# Patient Record
Sex: FEMALE | Race: WHITE | NOT HISPANIC OR LATINO | Employment: PART TIME | URBAN - METROPOLITAN AREA
[De-identification: names, ages, dates, MRNs, and addresses within clinical notes are randomized per-mention and may not be internally consistent; named-entity substitution may affect disease eponyms.]

---

## 2024-05-06 ENCOUNTER — LAB (OUTPATIENT)
Dept: LAB | Facility: HOSPITAL | Age: 64
End: 2024-05-06
Payer: COMMERCIAL

## 2024-05-06 ENCOUNTER — OFFICE VISIT (OUTPATIENT)
Dept: FAMILY MEDICINE CLINIC | Facility: CLINIC | Age: 64
End: 2024-05-06
Payer: COMMERCIAL

## 2024-05-06 VITALS
DIASTOLIC BLOOD PRESSURE: 82 MMHG | TEMPERATURE: 98.6 F | WEIGHT: 264.4 LBS | HEART RATE: 90 BPM | SYSTOLIC BLOOD PRESSURE: 124 MMHG | OXYGEN SATURATION: 96 % | BODY MASS INDEX: 44.05 KG/M2 | HEIGHT: 65 IN

## 2024-05-06 DIAGNOSIS — L40.50 PSORIATIC ARTHRITIS: ICD-10-CM

## 2024-05-06 DIAGNOSIS — G47.00 INSOMNIA, UNSPECIFIED TYPE: ICD-10-CM

## 2024-05-06 DIAGNOSIS — M47.816 ARTHRITIS, LUMBAR SPINE: Primary | ICD-10-CM

## 2024-05-06 DIAGNOSIS — K21.9 GASTROESOPHAGEAL REFLUX DISEASE, UNSPECIFIED WHETHER ESOPHAGITIS PRESENT: ICD-10-CM

## 2024-05-06 DIAGNOSIS — Z00.00 WELLNESS EXAMINATION: ICD-10-CM

## 2024-05-06 DIAGNOSIS — E78.5 HYPERLIPIDEMIA, UNSPECIFIED HYPERLIPIDEMIA TYPE: ICD-10-CM

## 2024-05-06 DIAGNOSIS — M47.816 ARTHRITIS, LUMBAR SPINE: ICD-10-CM

## 2024-05-06 LAB
DEPRECATED RDW RBC AUTO: 52.3 FL (ref 37–54)
ERYTHROCYTE [DISTWIDTH] IN BLOOD BY AUTOMATED COUNT: 14.9 % (ref 12.3–15.4)
HCT VFR BLD AUTO: 45.1 % (ref 34–46.6)
HGB BLD-MCNC: 15.3 G/DL (ref 12–15.9)
MCH RBC QN AUTO: 32.7 PG (ref 26.6–33)
MCHC RBC AUTO-ENTMCNC: 33.9 G/DL (ref 31.5–35.7)
MCV RBC AUTO: 96.4 FL (ref 79–97)
PLATELET # BLD AUTO: 280 10*3/MM3 (ref 140–450)
PMV BLD AUTO: 9.4 FL (ref 6–12)
RBC # BLD AUTO: 4.68 10*6/MM3 (ref 3.77–5.28)
WBC NRBC COR # BLD AUTO: 6.68 10*3/MM3 (ref 3.4–10.8)

## 2024-05-06 PROCEDURE — 85027 COMPLETE CBC AUTOMATED: CPT

## 2024-05-06 PROCEDURE — 82607 VITAMIN B-12: CPT

## 2024-05-06 PROCEDURE — 84443 ASSAY THYROID STIM HORMONE: CPT

## 2024-05-06 PROCEDURE — 80053 COMPREHEN METABOLIC PANEL: CPT

## 2024-05-06 PROCEDURE — 99204 OFFICE O/P NEW MOD 45 MIN: CPT | Performed by: STUDENT IN AN ORGANIZED HEALTH CARE EDUCATION/TRAINING PROGRAM

## 2024-05-06 PROCEDURE — 80061 LIPID PANEL: CPT

## 2024-05-06 PROCEDURE — 86140 C-REACTIVE PROTEIN: CPT

## 2024-05-06 PROCEDURE — 85652 RBC SED RATE AUTOMATED: CPT

## 2024-05-06 PROCEDURE — 82306 VITAMIN D 25 HYDROXY: CPT

## 2024-05-06 PROCEDURE — 83036 HEMOGLOBIN GLYCOSYLATED A1C: CPT

## 2024-05-06 PROCEDURE — 36415 COLL VENOUS BLD VENIPUNCTURE: CPT

## 2024-05-06 RX ORDER — QUETIAPINE FUMARATE 100 MG/1
100 TABLET, FILM COATED ORAL NIGHTLY
COMMUNITY
Start: 2024-05-03

## 2024-05-06 RX ORDER — ONDANSETRON 4 MG/1
4 TABLET, ORALLY DISINTEGRATING ORAL EVERY 8 HOURS PRN
Qty: 30 TABLET | Refills: 0 | Status: SHIPPED | OUTPATIENT
Start: 2024-05-06

## 2024-05-06 RX ORDER — ROSUVASTATIN CALCIUM 10 MG/1
10 TABLET, COATED ORAL DAILY
COMMUNITY

## 2024-05-06 RX ORDER — OMEPRAZOLE 40 MG/1
40 CAPSULE, DELAYED RELEASE ORAL DAILY
COMMUNITY
End: 2024-05-06 | Stop reason: SDUPTHER

## 2024-05-06 RX ORDER — QUETIAPINE FUMARATE 400 MG/1
400 TABLET, FILM COATED ORAL NIGHTLY
COMMUNITY
Start: 2024-05-03

## 2024-05-06 RX ORDER — ZOLPIDEM TARTRATE 10 MG/1
10 TABLET ORAL NIGHTLY PRN
COMMUNITY

## 2024-05-06 RX ORDER — OMEPRAZOLE 40 MG/1
40 CAPSULE, DELAYED RELEASE ORAL DAILY
Qty: 90 CAPSULE | Refills: 1 | Status: SHIPPED | OUTPATIENT
Start: 2024-05-06

## 2024-05-06 RX ORDER — NYSTATIN AND TRIAMCINOLONE ACETONIDE 100000; 1 [USP'U]/G; MG/G
1 OINTMENT TOPICAL 2 TIMES DAILY
COMMUNITY

## 2024-05-07 DIAGNOSIS — E53.8 LOW SERUM VITAMIN B12: Primary | ICD-10-CM

## 2024-05-07 LAB
25(OH)D3 SERPL-MCNC: 29 NG/ML (ref 30–100)
ALBUMIN SERPL-MCNC: 4.4 G/DL (ref 3.5–5.2)
ALBUMIN/GLOB SERPL: 1.3 G/DL
ALP SERPL-CCNC: 103 U/L (ref 39–117)
ALT SERPL W P-5'-P-CCNC: 16 U/L (ref 1–33)
ANION GAP SERPL CALCULATED.3IONS-SCNC: 14.6 MMOL/L (ref 5–15)
AST SERPL-CCNC: 22 U/L (ref 1–32)
BILIRUB SERPL-MCNC: 0.4 MG/DL (ref 0–1.2)
BUN SERPL-MCNC: 13 MG/DL (ref 8–23)
BUN/CREAT SERPL: 15.5 (ref 7–25)
CALCIUM SPEC-SCNC: 9.6 MG/DL (ref 8.6–10.5)
CHLORIDE SERPL-SCNC: 101 MMOL/L (ref 98–107)
CHOLEST SERPL-MCNC: 187 MG/DL (ref 0–200)
CO2 SERPL-SCNC: 23.4 MMOL/L (ref 22–29)
CREAT SERPL-MCNC: 0.84 MG/DL (ref 0.57–1)
CRP SERPL-MCNC: 0.33 MG/DL (ref 0–0.5)
EGFRCR SERPLBLD CKD-EPI 2021: 77.7 ML/MIN/1.73
ERYTHROCYTE [SEDIMENTATION RATE] IN BLOOD: 15 MM/HR (ref 0–30)
GLOBULIN UR ELPH-MCNC: 3.3 GM/DL
GLUCOSE SERPL-MCNC: 96 MG/DL (ref 65–99)
HBA1C MFR BLD: 6.3 % (ref 4.8–5.6)
HDLC SERPL-MCNC: 94 MG/DL (ref 40–60)
LDLC SERPL CALC-MCNC: 73 MG/DL (ref 0–100)
LDLC/HDLC SERPL: 0.74 {RATIO}
POTASSIUM SERPL-SCNC: 4.9 MMOL/L (ref 3.5–5.2)
PROT SERPL-MCNC: 7.7 G/DL (ref 6–8.5)
SODIUM SERPL-SCNC: 139 MMOL/L (ref 136–145)
TRIGL SERPL-MCNC: 117 MG/DL (ref 0–150)
TSH SERPL DL<=0.05 MIU/L-ACNC: 1.36 UIU/ML (ref 0.27–4.2)
VIT B12 BLD-MCNC: 196 PG/ML (ref 211–946)
VLDLC SERPL-MCNC: 20 MG/DL (ref 5–40)

## 2024-05-07 RX ORDER — CYANOCOBALAMIN, ISOPROPYL ALCOHOL 1000MCG/ML
1000 KIT INJECTION
Qty: 1 KIT | Refills: 11 | Status: SHIPPED | OUTPATIENT
Start: 2024-05-07

## 2024-05-09 ENCOUNTER — TELEPHONE (OUTPATIENT)
Dept: FAMILY MEDICINE CLINIC | Facility: CLINIC | Age: 64
End: 2024-05-09
Payer: COMMERCIAL

## 2024-05-10 DIAGNOSIS — I25.10 CORONARY ARTERY DISEASE DUE TO LIPID RICH PLAQUE: Primary | ICD-10-CM

## 2024-05-10 DIAGNOSIS — E66.01 MORBID (SEVERE) OBESITY DUE TO EXCESS CALORIES: ICD-10-CM

## 2024-05-10 DIAGNOSIS — I25.83 CORONARY ARTERY DISEASE DUE TO LIPID RICH PLAQUE: Primary | ICD-10-CM

## 2024-05-12 LAB — DRUGS UR: NORMAL

## 2024-05-13 ENCOUNTER — TELEPHONE (OUTPATIENT)
Dept: FAMILY MEDICINE CLINIC | Facility: CLINIC | Age: 64
End: 2024-05-13
Payer: COMMERCIAL

## 2024-05-13 NOTE — TELEPHONE ENCOUNTER
Caller: Reshma Akhtar    Relationship: Self    Best call back number: [phone]     PLEASE CALL AND ADVISE ON THE REASON FOR THE PAIN MANAGEMENT REFERRAL

## 2024-05-13 NOTE — TELEPHONE ENCOUNTER
Caller: Reshma Akhtar    Relationship: Self    Best call back number: [phone]     SHE WANTS TO KNOW IF SUSY BRUNSON WILL BE REFILLING HER AMBIEN PRESCRIPTION WHEN SHE IS READY FOR A REFILL.    PLEASE CALL AND ADVISE.

## 2024-05-13 NOTE — TELEPHONE ENCOUNTER
HUB TO RELAY    LVM. Please let patient know pain management referral is for the injections she requested she wanted in her back.

## 2024-05-13 NOTE — TELEPHONE ENCOUNTER
Caller: Reshma Akhtar    Relationship: Self    Best call back number: [phone]     Requested Prescriptions:   zolpidem (AMBIEN) 10 MG tablet      Last office visit with prescribing clinician: 5/6/2024   Last telemedicine visit with prescribing clinician: Visit date not found   Next office visit with prescribing clinician: 5/30/2024     Additional details provided by patient: PATIENT HAS MEDICATION AND DOES NOT NEED A REFILL AT THIS TIME. SHE USED TO LIVE IN [place] AND HER PSYCHIATRIST PRESCRIBED IT AND WHEN LIVING IN [place] HER PCP PRESCRIBED IT. ASKING IF DR BRUNSON WILL TAKE OVER THIS PRESCRIPTION OR ADVISE WHO SHE SHOULD SEE TO CONTINUE THE PRESCRIPTION.    Does the patient have less than a 3 day supply:  [] Yes  [x] No HAS PLENTY    Would you like a call back once the refill request has been completed: [x] Yes TO ADVISE    If the office needs to give you a call back, can they leave a voicemail: [x] Yes  ALSO OK TO RESPOND THRU Arabella Avila Rep   05/13/24 13:33 EDT

## 2024-05-13 NOTE — TELEPHONE ENCOUNTER
Caller: Reshma Akhtar    Relationship to patient: Self    Best call back number: [phone]     Chief complaint: B-12 SHOT    Type of visit: B-12 SHOT ONLY    Additional notes: PATIENT HAS PICKED UP THE B-12 SHOTS FROM THE PHARMACY. THEY DID NOT COME WITH NEEDLES. WAS TOLD BY PHARMACY TO CALL PCP TO SCHEDULE.

## 2024-05-13 NOTE — TELEPHONE ENCOUNTER
Pharmacy Name: [facility] [address] [place] [address] - [address] [facility] - [phone] [place] [phone] FX     Reference Number (if applicable):     Pharmacy representative name: RON    Pharmacy representative phone number: [phone]     What medication are you calling in regards to: NEEDLES THAT WHERE CALLED IN ARE NOT IN STOCK. PHARMACY WOULD LIKE TO SEE IF THE 18 JUAN MIGUEL BY 1 INCH WOULD BE OK?    What question does the pharmacy have: CARMELLA    Who is the provider that prescribed the medication: SUSY BRUNSON    Additional notes: CARMELLA

## 2024-05-13 NOTE — TELEPHONE ENCOUNTER
HUB TO RELAY    LVM. Please let patient know Ambien was sent over to pharmacy and she can walk in at anytime for B12 shot.

## 2024-05-13 NOTE — TELEPHONE ENCOUNTER
Called and spoke with patient and she is going to follow up with Rheumatology about the injections. She will see pain management if rheumatology wont do them.

## 2024-05-13 NOTE — TELEPHONE ENCOUNTER
Name: Reshma Akhtar    Relationship: Self    Best Callback Number: [phone]     HUB PROVIDED THE RELAY MESSAGE FROM THE OFFICE   PATIENT HAS FURTHER QUESTIONS AND WOULD LIKE A CALL BACK AT THE FOLLOWING PHONE NUMBER [phone]    ADDITIONAL INFORMATION: PATIENT STATES SHE USUALLY GOES TO HER RHEUMATOLOGIST FOR THE LOW BACK INJECTIONS. ASKING IF SHE SHOULD SCHEDULE WITH THEM OR TO PAIN MANAGEMENT.

## 2024-05-28 RX ORDER — ROSUVASTATIN CALCIUM 10 MG/1
10 TABLET, COATED ORAL DAILY
Qty: 90 TABLET | Refills: 0 | Status: SHIPPED | OUTPATIENT
Start: 2024-05-28

## 2024-05-29 DIAGNOSIS — I70.90 ATHEROSCLEROSIS: Primary | ICD-10-CM

## 2024-05-30 ENCOUNTER — OFFICE VISIT (OUTPATIENT)
Dept: FAMILY MEDICINE CLINIC | Facility: CLINIC | Age: 64
End: 2024-05-30
Payer: COMMERCIAL

## 2024-05-30 VITALS
RESPIRATION RATE: 20 BRPM | BODY MASS INDEX: 44.82 KG/M2 | DIASTOLIC BLOOD PRESSURE: 88 MMHG | HEART RATE: 82 BPM | HEIGHT: 65 IN | SYSTOLIC BLOOD PRESSURE: 142 MMHG | OXYGEN SATURATION: 95 % | TEMPERATURE: 98.6 F | WEIGHT: 269 LBS

## 2024-05-30 DIAGNOSIS — Z13.820 ENCOUNTER FOR OSTEOPOROSIS SCREENING IN ASYMPTOMATIC POSTMENOPAUSAL PATIENT: ICD-10-CM

## 2024-05-30 DIAGNOSIS — Z00.00 WELLNESS EXAMINATION: ICD-10-CM

## 2024-05-30 DIAGNOSIS — Z12.31 ENCOUNTER FOR SCREENING MAMMOGRAM FOR MALIGNANT NEOPLASM OF BREAST: ICD-10-CM

## 2024-05-30 DIAGNOSIS — F51.01 PRIMARY INSOMNIA: Primary | ICD-10-CM

## 2024-05-30 DIAGNOSIS — K21.9 GASTROESOPHAGEAL REFLUX DISEASE, UNSPECIFIED WHETHER ESOPHAGITIS PRESENT: ICD-10-CM

## 2024-05-30 DIAGNOSIS — Z78.0 ENCOUNTER FOR OSTEOPOROSIS SCREENING IN ASYMPTOMATIC POSTMENOPAUSAL PATIENT: ICD-10-CM

## 2024-05-30 DIAGNOSIS — Z12.11 SCREENING FOR COLON CANCER: ICD-10-CM

## 2024-05-30 PROCEDURE — 99214 OFFICE O/P EST MOD 30 MIN: CPT | Performed by: STUDENT IN AN ORGANIZED HEALTH CARE EDUCATION/TRAINING PROGRAM

## 2024-05-30 PROCEDURE — 99396 PREV VISIT EST AGE 40-64: CPT | Performed by: STUDENT IN AN ORGANIZED HEALTH CARE EDUCATION/TRAINING PROGRAM

## 2024-05-30 RX ORDER — OMEPRAZOLE 40 MG/1
40 CAPSULE, DELAYED RELEASE ORAL DAILY
Qty: 90 CAPSULE | Refills: 1 | Status: SHIPPED | OUTPATIENT
Start: 2024-05-30

## 2024-05-30 RX ORDER — ZOLPIDEM TARTRATE 10 MG/1
10 TABLET ORAL NIGHTLY PRN
Qty: 30 TABLET | Refills: 0 | Status: SHIPPED | OUTPATIENT
Start: 2024-05-30

## 2024-05-30 NOTE — PROGRESS NOTES
"Chief Complaint  Annual Exam     History of Present Illness      Annual exam  Colonoscopy ordered.   Mammogram ordered   Pap smear: she will call to schedule. She reports last was normal.   Dexa ordered.   Counseled on diet and exercise including limited sweets and sugars to focus on lean meat and vegetables. Counseled on 50 minutes of moderate exercise 3 times per week.   Recommend dental and eye exam  hiv hep c screening: she declines  Vaccines recommended:  covid vaccine  Rsv vaccine  Shingles  Pneumonia  Tdap  hepatitis      The following portions of the patient's history were reviewed and updated as appropriate: allergies, current medications, past family history, past medical history, past social history, past surgical history, and problem list.    OBJECTIVE:  /88   Pulse 82   Temp 98.6 °F (37 °C) (Temporal)   Resp 20   Ht 165.1 cm (65\")   Wt 122 kg (269 lb)   SpO2 95%   BMI 44.76 kg/m²       Physical Exam  Constitutional:       General: She is not in acute distress.     Appearance: Normal appearance.   HENT:      Head: Normocephalic and atraumatic.   Eyes:      Extraocular Movements: Extraocular movements intact.   Cardiovascular:      Rate and Rhythm: Normal rate and regular rhythm.      Heart sounds: No murmur heard.  Pulmonary:      Effort: Pulmonary effort is normal. No respiratory distress.      Breath sounds: Normal breath sounds. No stridor. No wheezing, rhonchi or rales.   Skin:     Findings: No rash.   Neurological:      General: No focal deficit present.      Mental Status: She is alert.   Psychiatric:         Mood and Affect: Mood normal.                    Assessment and Plan   Diagnoses and all orders for this visit:    1. Primary insomnia (Primary)  -     zolpidem (AMBIEN) 10 MG tablet; Take 1 tablet by mouth At Night As Needed for Sleep.  Dispense: 30 tablet; Refill: 0    2. Gastroesophageal reflux disease, unspecified whether esophagitis present  -     omeprazole (priLOSEC) 40 MG " "capsule; Take 1 capsule by mouth Daily.  Dispense: 90 capsule; Refill: 1    3. Encounter for screening mammogram for malignant neoplasm of breast  -     Mammo screening digital tomosynthesis bilateral w CAD; Future    4. Screening for colon cancer  -     Ambulatory Referral For Screening Colonoscopy    5. Wellness examination    6. Encounter for osteoporosis screening in asymptomatic postmenopausal patient  -     DEXA Bone Density Axial; Future    Other orders  -     Semaglutide-Weight Management 0.25 MG/0.5ML solution auto-injector; Inject 0.5 mL under the skin into the appropriate area as directed 1 (One) Time Per Week.  Dispense: 2 mL; Refill: 0  -     Needle, Disp, 20G X 1\" misc; Use 1 each Every 28 (Twenty-Eight) Days.  Dispense: 12 each; Refill: 0      Annual exam  Colonoscopy ordered.   Mammogram ordered   Pap smear: she will call to schedule. She reports last was normal.   Dexa ordered.   Counseled on diet and exercise including limited sweets and sugars to focus on lean meat and vegetables. Counseled on 50 minutes of moderate exercise 3 times per week.   Recommend dental and eye exam  hiv hep c screening: she declines  Vaccines recommended:  covid vaccine  Rsv vaccine  Shingles  Pneumonia  Tdap  hepatitis    Insomnia  Counseled on addiction, falls, sedation on this medication and to keep it locked away from others. No unwanted se at this time.       Weight management  She reports ozempic wasn't covered at the pharmacy we sent it to will send to correct pharmacy. Discussed side effects are same as we discussed before.   If still not covered start contrave. Counseled on possible side effects such as nausea dizziness headache. Counseled on rare side effects such as serotonin syndrome which would be fast heart rate elevated bp irritability . Discussed s/e such as blurry vision dry mouth constipation urinary issues. Counseled that mood changes or suicidal thoughts may happen. She will monitor for these and seek " care if this happens.   Advised her to not take with alcohol.       Return in about 1 month (around 6/30/2024).       Natasha Mccullough D.O.  [facility] [place]

## 2024-06-03 ENCOUNTER — PREP FOR SURGERY (OUTPATIENT)
Dept: OTHER | Facility: HOSPITAL | Age: 64
End: 2024-06-03

## 2024-06-03 DIAGNOSIS — Z12.11 ENCOUNTER FOR SCREENING COLONOSCOPY: Primary | ICD-10-CM

## 2024-06-04 DIAGNOSIS — K21.9 GASTROESOPHAGEAL REFLUX DISEASE, UNSPECIFIED WHETHER ESOPHAGITIS PRESENT: ICD-10-CM

## 2024-06-04 RX ORDER — OMEPRAZOLE 40 MG/1
40 CAPSULE, DELAYED RELEASE ORAL DAILY
Qty: 90 CAPSULE | Refills: 1 | Status: SHIPPED | OUTPATIENT
Start: 2024-06-04

## 2024-06-04 RX ORDER — NYSTATIN AND TRIAMCINOLONE ACETONIDE 100000; 1 [USP'U]/G; MG/G
1 OINTMENT TOPICAL 2 TIMES DAILY
OUTPATIENT
Start: 2024-06-04

## 2024-06-05 ENCOUNTER — TELEPHONE (OUTPATIENT)
Dept: FAMILY MEDICINE CLINIC | Facility: CLINIC | Age: 64
End: 2024-06-05

## 2024-06-05 ENCOUNTER — TELEPHONE (OUTPATIENT)
Dept: FAMILY MEDICINE CLINIC | Facility: CLINIC | Age: 64
End: 2024-06-05
Payer: COMMERCIAL

## 2024-06-05 DIAGNOSIS — M54.50 CHRONIC BILATERAL LOW BACK PAIN WITHOUT SCIATICA: Primary | ICD-10-CM

## 2024-06-05 DIAGNOSIS — G89.29 CHRONIC BILATERAL LOW BACK PAIN WITHOUT SCIATICA: Primary | ICD-10-CM

## 2024-06-05 NOTE — TELEPHONE ENCOUNTER
Caller: Reshma Akhtar    Relationship: Self    Best call back number: [phone]     What medication are you requesting: DICLOFENAC-SODIUM TOPICAL GEL 1%    What are your current symptoms: LOWER BACK PAIN    How long have you been experiencing symptoms: OVER A YEAR    Have you had these symptoms before:    [x] Yes  [] No    Have you been treated for these symptoms before:   [x] Yes  [] No    If a prescription is needed, what is your preferred pharmacy and phone number: [facility] [address] [place] [address] [phone] [facility] [phone]      Additional notes:

## 2024-06-06 ENCOUNTER — TELEPHONE (OUTPATIENT)
Dept: FAMILY MEDICINE CLINIC | Facility: CLINIC | Age: 64
End: 2024-06-06
Payer: COMMERCIAL

## 2024-06-06 NOTE — TELEPHONE ENCOUNTER
Caller: Reshma Akhtar    Relationship: Self    Best call back number: [phone]    Which medication are you concerned about: WEGOVY    Who prescribed you this medication: DR SUSY BRUNSON    What are your concerns: PATIENT STATES INSURANCE HAS DENIED THE PRIOR AUTHORIZATION FOR THE WEGOVY SO WOULD LIKE A PRESCRIPTION SENT TO [facility] FOR CONTRAVE. PLEASE ADVISE AND CALL PATIENT BACK

## 2024-06-06 NOTE — TELEPHONE ENCOUNTER
Can she do video visit to discuss contrave side effects , there are some I'm worried about with her current meds

## 2024-06-13 ENCOUNTER — PRIOR AUTHORIZATION (OUTPATIENT)
Dept: FAMILY MEDICINE CLINIC | Facility: CLINIC | Age: 64
End: 2024-06-13
Payer: COMMERCIAL

## 2024-06-13 NOTE — TELEPHONE ENCOUNTER
6/13/24    PA for Wegovy denied      Reshma Akhtar   (Key: [id number])  Rx #: [id number]  Wegovy 0.25MG/0.5ML auto-injectors  Determination  Unfavorable  7 days ago  Your prior authorization for Wegovy has been denied.  When applicable, information about how to complete an appeal for this patient will be sent to you. Please also see the determination letter provided by the payer/PBM for more information.    Message from plan: Your request has been denied

## 2024-06-24 ENCOUNTER — TELEMEDICINE (OUTPATIENT)
Dept: FAMILY MEDICINE CLINIC | Facility: CLINIC | Age: 64
End: 2024-06-24
Payer: COMMERCIAL

## 2024-06-24 DIAGNOSIS — E66.01 MORBID (SEVERE) OBESITY DUE TO EXCESS CALORIES: Primary | ICD-10-CM

## 2024-06-24 PROCEDURE — 99213 OFFICE O/P EST LOW 20 MIN: CPT | Performed by: STUDENT IN AN ORGANIZED HEALTH CARE EDUCATION/TRAINING PROGRAM

## 2024-06-24 NOTE — PROGRESS NOTES
You have chosen to receive care through a telehealth visit.  Do you consent to use a video/audio connection for your medical care today? Yes     Patient and provider in [place].     Chief Complaint  Reshma Akhtar is a 64 y.o. female who presents via video-conference for follow up.     History of Present Illness      Weight management  Insurance did not cover semaglutide.   She would like to be on weight loss medication. Given chronic back pain she has not been able to exercise. She did well on welbutrin before.          The following portions of the patient's history were reviewed and updated as appropriate: allergies, current medications, past family history, past medical history, past social history, past surgical history, and problem list.        Objective  Vital signs available: No      Assessment/Plan   Obesity bmi 44.76    We discussed bariatric surgery. She is not interested reports insurance issues.     Counseled on risks of welbutrin such as nausea dizziness headache mood changes or depression chin or suicidal thoughts.   Advised her to not take naltrexone with alcohol or opioids.   She has no history of seizures or eating disorder.   Counseled on risks of the med also include liver injury or high blood pressure.     Advised her to F/U in 1 mo.       Natasha Mccullough D.O.  [facility] [place]     15 minutes were spent reviewing the patient's questionnaire, formulating a treatment plan, and relaying information to the patient via [employer].

## 2024-06-26 ENCOUNTER — OFFICE VISIT (OUTPATIENT)
Dept: CARDIOLOGY | Facility: CLINIC | Age: 64
End: 2024-06-26
Payer: COMMERCIAL

## 2024-06-26 VITALS
OXYGEN SATURATION: 90 % | WEIGHT: 273 LBS | HEIGHT: 65 IN | HEART RATE: 84 BPM | SYSTOLIC BLOOD PRESSURE: 122 MMHG | DIASTOLIC BLOOD PRESSURE: 76 MMHG | BODY MASS INDEX: 45.48 KG/M2

## 2024-06-26 DIAGNOSIS — E78.2 MIXED HYPERLIPIDEMIA: Primary | ICD-10-CM

## 2024-06-26 DIAGNOSIS — I70.0 CALCIFICATION OF ABDOMINAL AORTA: ICD-10-CM

## 2024-06-26 PROCEDURE — 99204 OFFICE O/P NEW MOD 45 MIN: CPT | Performed by: INTERNAL MEDICINE

## 2024-06-26 PROCEDURE — 93000 ELECTROCARDIOGRAM COMPLETE: CPT | Performed by: INTERNAL MEDICINE

## 2024-06-26 NOTE — PROGRESS NOTES
"[facility]  Consultation H&P  Reshma Akhtar  1960  [phone]  There is no work phone number on file..    VISIT DATE:  06/26/2024    PCP: Natasha Mccullough DO  [address] [phone]    CC:  Chief Complaint   Patient presents with    Coronary Artery Disease         ASSESSMENT:   Diagnosis Plan   1. Mixed hyperlipidemia        2. Calcification of abdominal aorta              PLAN:  Hyperlipidemia: Goal LDL less than 100, ideally less than 70.  Continue current dose of rosuvastatin.  Reasonably well-controlled.    Calcification of abdominal aorta: Clinically asymptomatic.  Agree with coronary calcium scoring to help with cardiac risk stratification.  Continue statin.  Not recommending initiation of antiplatelet therapy at this time.  Afterload well-controlled.    History of Present Illness   64-year-old prediabetic female with family history of coronary disease was recently noted to have aortic calcifications noted on plain radiographic films of her lumbar spine.  She denies chest pain, palpitations, dyspnea on exertion.  No claudication.  Symptoms main limited due to low back pain.  Blood pressures run less than 130/80 mmHg.  She is compliant with medical therapy.  Reviewed recent lipid profile.  Remote, less than 10-pack-year history of smoking, quit before the age of 29.    PHYSICAL EXAMINATION:  Vitals:    06/26/24 1006   BP: 122/76   BP Location: Right arm   Patient Position: Sitting   Pulse: 84   SpO2: 90%   Weight: 124 kg (273 lb)   Height: 165.1 cm (65\")     General Appearance:    Alert, cooperative, no distress, appears stated age   Head:    Normocephalic, without obvious abnormality, atraumatic   Eyes:    conjunctiva/corneas clear, EOM's intact, fundi     benign, both eyes   Ears:    Normal TM's and external ear canals, both ears   Nose:   Nares normal, septum midline, mucosa normal, no drainage    or sinus tenderness   Throat:   " Lips, mucosa, and tongue normal; teeth and gums normal   Neck:   Supple, symmetrical, trachea midline, no adenopathy;     thyroid:  no enlargement/tenderness/nodules; no carotid    bruit or JVD   Back:     Symmetric, no curvature, ROM normal, no CVA tenderness   Lungs:     Clear to auscultation bilaterally, respirations unlabored   Chest Wall:    No tenderness or deformity    Heart:    Regular rate and rhythm, S1 and S2 normal, no murmur, rub   or gallop, normal carotid impulse bilaterally without bruit.   Abdomen:     Soft, non-tender, bowel sounds active all four quadrants,     no masses, no organomegaly   Extremities:   Extremities normal, atraumatic, no cyanosis or edema   Pulses:   2+ and symmetric all extremities   Skin:   Skin color, texture, turgor normal, no rashes or lesions   Lymph nodes:   Cervical, supraclavicular, and axillary nodes normal   Neurologic:   normal strength, sensation intact     throughout       Diagnostic Data:    ECG 12 Lead    Date/Time: 6/26/2024 10:19 AM  Performed by: Salbador Esquivel III, MD    Authorized by: Salbador Esquivel III, MD  Previous ECG: no previous ECG available  Rhythm: sinus rhythm    Clinical impression: normal ECG        Lab Results   Component Value Date    TRIG 117 05/06/2024    HDL 94 (H) 05/06/2024     Lab Results   Component Value Date    GLUCOSE 96 05/06/2024    BUN 13 05/06/2024    CREATININE 0.84 05/06/2024     05/06/2024    K 4.9 05/06/2024     05/06/2024    CO2 23.4 05/06/2024     Lab Results   Component Value Date    HGBA1C 6.30 (H) 05/06/2024     Lab Results   Component Value Date    WBC 6.68 05/06/2024    HGB 15.3 05/06/2024    HCT 45.1 05/06/2024     05/06/2024       PROBLEM LIST:  Patient Active Problem List   Diagnosis    Primary insomnia    Wellness examination    Morbid (severe) obesity due to excess calories    Mixed hyperlipidemia    Calcification of abdominal aorta       PAST MEDICAL HX  Past Medical History:   Diagnosis Date     "Degenerative disc disease, lumbar     GERD (gastroesophageal reflux disease)        Allergies  Allergies   Allergen Reactions    Codeine Headache    Penicillins Rash       Current Medications    Current Outpatient Medications:     Cyanocobalamin (B-12 Compliance Injection) 1000 MCG/ML kit, Inject 1 mL as directed Every 28 (Twenty-Eight) Days., Disp: 1 kit, Rfl: 11    Diclofenac Sodium (VOLTAREN) 1 % gel gel, Apply 4 g topically to the appropriate area as directed 4 (Four) Times a Day As Needed (back pain)., Disp: 100 g, Rfl: 1    naltrexone-bupropion ER (CONTRAVE) 8-90 MG tablet, Wk 1: 1 tab daily, Wk 2: 1 tab twice a day, Wk 3: 2 tabs in AM, 1 tab in PM, Wk 4: 2 tabs twice a day, Maintenance dose: 2 tabs twice daily., Disp: 120 tablet, Rfl: 0    Needle, Disp, 20G X 1\" misc, Use 1 each Every 28 (Twenty-Eight) Days., Disp: 12 each, Rfl: 0    nystatin-triamcinolone (MYCOLOG) 318770-1.1 UNIT/GM-% ointment, Apply 1 Application topically to the appropriate area as directed 2 (Two) Times a Day., Disp: , Rfl:     omeprazole (priLOSEC) 40 MG capsule, Take 1 capsule by mouth Daily., Disp: 90 capsule, Rfl: 1    QUEtiapine (SEROquel) 100 MG tablet, Take 1 tablet by mouth Every Night., Disp: , Rfl:     QUEtiapine (SEROquel) 400 MG tablet, Take 1 tablet by mouth Every Night., Disp: , Rfl:     rosuvastatin (CRESTOR) 10 MG tablet, Take 1 tablet by mouth Daily., Disp: 90 tablet, Rfl: 0    zolpidem (AMBIEN) 10 MG tablet, Take 1 tablet by mouth At Night As Needed for Sleep., Disp: 30 tablet, Rfl: 0         ROS  ROS      SOCIAL HX  Social History     Socioeconomic History    Marital status:    Tobacco Use    Smoking status: Former     Current packs/day: 0.00     Average packs/day: 1 pack/day for 10.0 years (10.0 ttl pk-yrs)     Types: Cigarettes     Start date: 1979     Quit date: 1989     Years since quittin.5     Passive exposure: Never    Smokeless tobacco: Never   Vaping Use    Vaping status: Never Used "   Substance and Sexual Activity    Alcohol use: Yes    Drug use: Never    Sexual activity: Not Currently       FAMILY HX  Family History   Problem Relation Age of Onset    Heart disease Mother     Heart disease Father     Diabetes Brother              Salbador Esquivel III, MD, FACC

## 2024-06-27 DIAGNOSIS — F51.01 PRIMARY INSOMNIA: ICD-10-CM

## 2024-06-27 RX ORDER — ZOLPIDEM TARTRATE 10 MG/1
10 TABLET ORAL NIGHTLY PRN
Qty: 30 TABLET | Refills: 0 | Status: SHIPPED | OUTPATIENT
Start: 2024-06-27

## 2024-06-27 NOTE — TELEPHONE ENCOUNTER
Rx Refill Note  Requested Prescriptions     Pending Prescriptions Disp Refills    zolpidem (AMBIEN) 10 MG tablet 30 tablet 0     Sig: Take 1 tablet by mouth At Night As Needed for Sleep.      Last office visit with prescribing clinician: 5/30/2024   Last telemedicine visit with prescribing clinician: 6/24/2024   Next office visit with prescribing clinician: Visit date not found                         Would you like a call back once the refill request has been completed: [] Yes [] No    If the office needs to give you a call back, can they leave a voicemail: [] Yes [] No    Rosina Bustillos MA  06/27/24, 10:44 EDT

## 2024-07-05 ENCOUNTER — PATIENT MESSAGE (OUTPATIENT)
Dept: FAMILY MEDICINE CLINIC | Facility: CLINIC | Age: 64
End: 2024-07-05
Payer: COMMERCIAL

## 2024-07-10 NOTE — TELEPHONE ENCOUNTER
Caller: Reshma Akhtar    Relationship: Self    Best call back number: [phone]    Requested Prescriptions:   Requested Prescriptions     Pending Prescriptions Disp Refills    QUEtiapine (SEROquel) 400 MG tablet       Sig: Take 1 tablet by mouth Every Night.    QUEtiapine (SEROquel) 100 MG tablet       Sig: Take 1 tablet by mouth Every Night.        Pharmacy where request should be sent: [address] [phone] [facility] [phone]      Last office visit with prescribing clinician: 5/30/2024   Last telemedicine visit with prescribing clinician: 6/24/2024   Next office visit with prescribing clinician: Visit date not found     Additional details provided by patient:       Arabella Fernandez Rep   07/10/24 16:34 EDT

## 2024-07-12 RX ORDER — QUETIAPINE FUMARATE 100 MG/1
100 TABLET, FILM COATED ORAL NIGHTLY
Qty: 90 TABLET | Refills: 0 | Status: SHIPPED | OUTPATIENT
Start: 2024-07-12

## 2024-07-12 RX ORDER — QUETIAPINE FUMARATE 400 MG/1
400 TABLET, FILM COATED ORAL NIGHTLY
Qty: 90 TABLET | Refills: 0 | Status: SHIPPED | OUTPATIENT
Start: 2024-07-12

## 2024-07-25 DIAGNOSIS — F51.01 PRIMARY INSOMNIA: ICD-10-CM

## 2024-07-25 RX ORDER — ZOLPIDEM TARTRATE 10 MG/1
10 TABLET ORAL NIGHTLY PRN
Qty: 30 TABLET | Refills: 0 | Status: SHIPPED | OUTPATIENT
Start: 2024-07-25

## 2024-08-07 ENCOUNTER — TELEPHONE (OUTPATIENT)
Dept: FAMILY MEDICINE CLINIC | Facility: CLINIC | Age: 64
End: 2024-08-07
Payer: COMMERCIAL

## 2024-08-16 ENCOUNTER — OFFICE VISIT (OUTPATIENT)
Age: 64
End: 2024-08-16
Payer: COMMERCIAL

## 2024-08-16 VITALS
DIASTOLIC BLOOD PRESSURE: 84 MMHG | SYSTOLIC BLOOD PRESSURE: 128 MMHG | OXYGEN SATURATION: 94 % | BODY MASS INDEX: 46.7 KG/M2 | WEIGHT: 280.3 LBS | HEART RATE: 80 BPM | HEIGHT: 65 IN

## 2024-08-16 DIAGNOSIS — F51.04 PSYCHOPHYSIOLOGICAL INSOMNIA: Primary | ICD-10-CM

## 2024-08-16 DIAGNOSIS — Z91.89 AT RISK FOR SLEEP APNEA: ICD-10-CM

## 2024-08-16 DIAGNOSIS — Z86.59 HISTORY OF ANXIETY DISORDER: ICD-10-CM

## 2024-08-16 DIAGNOSIS — Z51.81 ENCOUNTER FOR THERAPEUTIC DRUG MONITORING: ICD-10-CM

## 2024-08-16 DIAGNOSIS — E66.01 MORBID OBESITY WITH BMI OF 45.0-49.9, ADULT: ICD-10-CM

## 2024-08-16 PROCEDURE — 90792 PSYCH DIAG EVAL W/MED SRVCS: CPT | Performed by: NURSE PRACTITIONER

## 2024-08-16 RX ORDER — TRAZODONE HYDROCHLORIDE 50 MG/1
50 TABLET ORAL NIGHTLY
Qty: 30 TABLET | Refills: 1 | Status: SHIPPED | OUTPATIENT
Start: 2024-08-16
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
## 2024-08-19 ENCOUNTER — TELEPHONE (OUTPATIENT)
Age: 64
End: 2024-08-19
Payer: COMMERCIAL

## 2024-08-19 NOTE — TELEPHONE ENCOUNTER
Reshma called and is requesting a refill on Zolpidem to be sent to [facility] pharmacy at [address]  Please advise.

## 2024-08-19 NOTE — TELEPHONE ENCOUNTER
Called NA LVM informing PT she is to defer to her PCP for refills on Ambien Rx. Informed PT to call back with any further questions.

## 2024-08-20 RX ORDER — ROSUVASTATIN CALCIUM 10 MG/1
10 TABLET, COATED ORAL DAILY
Qty: 90 TABLET | Refills: 0 | Status: SHIPPED | OUTPATIENT
Start: 2024-08-20

## 2024-08-21 DIAGNOSIS — F51.01 PRIMARY INSOMNIA: ICD-10-CM

## 2024-08-21 LAB
1OH-MIDAZOLAM UR QL SCN: NOT DETECTED NG/MG CREAT
6MAM UR QL SCN: NEGATIVE NG/ML
7AMINOCLONAZEPAM/CREAT UR: NOT DETECTED NG/MG CREAT
A-OH ALPRAZ/CREAT UR: NOT DETECTED NG/MG CREAT
A-OH-TRIAZOLAM/CREAT UR CFM: NOT DETECTED NG/MG CREAT
ACP UR QL CFM: NOT DETECTED
ALPRAZ/CREAT UR CFM: NOT DETECTED NG/MG CREAT
AMPHETAMINES UR QL SCN: NEGATIVE NG/ML
APAP UR QL SCN: NEGATIVE UG/ML
BARBITURATES UR QL SCN: NEGATIVE NG/ML
BENZODIAZ SCN METH UR: NEGATIVE
BUPRENORPHINE UR QL SCN: NEGATIVE
BUPRENORPHINE/CREAT UR: NOT DETECTED NG/MG CREAT
CANNABINOIDS UR QL SCN: NEGATIVE NG/ML
CARISOPRODOL UR QL: NEGATIVE NG/ML
CLONAZEPAM/CREAT UR CFM: NOT DETECTED NG/MG CREAT
COCAINE+BZE UR QL SCN: NEGATIVE NG/ML
CREAT UR-MCNC: 138 MG/DL
D-METHORPHAN UR-MCNC: NOT DETECTED NG/ML
D-METHORPHAN+LEVORPHANOL UR QL: NOT DETECTED
DESALKYLFLURAZ/CREAT UR: NOT DETECTED NG/MG CREAT
DIAZEPAM/CREAT UR: NOT DETECTED NG/MG CREAT
ETG ETS UR QL CFM: NORMAL
ETHANOL UR QL SCN: NEGATIVE G/DL
ETHANOL UR QL SCN: NORMAL NG/ML
ETHYL GLUCURONIDE UR CFM-MCNC: 9370 NG/MG CREAT
ETHYL SULFATE UR CFM-MCNC: 2131 NG/MG CREAT
FENTANYL CTO UR SCN-MCNC: NEGATIVE NG/ML
FENTANYL/CREAT UR: NOT DETECTED NG/MG CREAT
FLUNITRAZEPAM UR QL SCN: NOT DETECTED NG/MG CREAT
GABAPENTIN UR-MCNC: NEGATIVE UG/ML
HALLUCINOGENS UR: NEGATIVE
HYPNOTICS UR QL SCN: NEGATIVE
KETAMINE UR QL: NOT DETECTED
LORAZEPAM/CREAT UR: NOT DETECTED NG/MG CREAT
MEPERIDINE UR QL SCN: NEGATIVE NG/ML
METHADONE UR QL SCN: NEGATIVE NG/ML
METHADONE+METAB UR QL SCN: NEGATIVE NG/ML
MIDAZOLAM/CREAT UR CFM: NOT DETECTED NG/MG CREAT
MISCELLANEOUS, UR: NEGATIVE
NORBUPRENORPHINE/CREAT UR: NOT DETECTED NG/MG CREAT
NORDIAZEPAM/CREAT UR: NOT DETECTED NG/MG CREAT
NORFENTANYL/CREAT UR: NOT DETECTED NG/MG CREAT
NORFLUNITRAZEPAM UR-MCNC: NOT DETECTED NG/MG CREAT
NORKETAMINE UR-MCNC: NOT DETECTED UG/ML
OPIATES UR SCN-MCNC: NEGATIVE NG/ML
OXAZEPAM/CREAT UR: NOT DETECTED NG/MG CREAT
OXYCODONE CTO UR SCN-MCNC: NEGATIVE NG/ML
PCP UR QL SCN: NEGATIVE NG/ML
PRESCRIBED MEDICATIONS: NORMAL
PROPOXYPH UR QL SCN: NEGATIVE NG/ML
TAPENTADOL CTO UR SCN-MCNC: NEGATIVE NG/ML
TEMAZEPAM/CREAT UR: NOT DETECTED NG/MG CREAT
TRAMADOL UR QL SCN: NEGATIVE NG/ML
ZALEPLON UR-MCNC: NOT DETECTED NG/ML
ZOLPIDEM PHENYL-4-CARB UR QL SCN: NOT DETECTED
ZOLPIDEM UR QL SCN: NOT DETECTED
ZOPICLONE-N-OXIDE UR-MCNC: NOT DETECTED NG/ML

## 2024-08-21 RX ORDER — ZOLPIDEM TARTRATE 10 MG/1
10 TABLET ORAL NIGHTLY PRN
Qty: 30 TABLET | Refills: 0 | Status: CANCELLED | OUTPATIENT
Start: 2024-08-21

## 2024-08-21 NOTE — TELEPHONE ENCOUNTER
Caller: Hermilo Reshmaantoinette De Jesus    Relationship: Self    Best call back number: [phone]     Requested Prescriptions:   Requested Prescriptions     Pending Prescriptions Disp Refills    zolpidem (AMBIEN) 10 MG tablet 30 tablet 0     Sig: Take 1 tablet by mouth At Night As Needed for Sleep.        Pharmacy where request should be sent: [facility] [address] [place] [address] [phone] [facility] [phone] FX     Last office visit with prescribing clinician: 5/30/2024   Last telemedicine visit with prescribing clinician: 6/24/2024   Next office visit with prescribing clinician: Visit date not found     Additional details provided by patient: PATIENT STATES DR AL HAS DEFERRED PRESCRIBING THIS MEDICATION TO DR BRUNSON FOR REFILLS    Does the patient have less than a 3 day supply:  [x] Yes  [] No    Would you like a call back once the refill request has been completed: [] Yes [x] No    If the office needs to give you a call back, can they leave a voicemail: [] Yes [x] No    Arabella Charlton Rep   08/21/24 11:37 EDT

## 2024-08-22 DIAGNOSIS — F51.01 PRIMARY INSOMNIA: ICD-10-CM

## 2024-08-22 RX ORDER — ZOLPIDEM TARTRATE 10 MG/1
10 TABLET ORAL NIGHTLY PRN
Qty: 30 TABLET | Refills: 0 | Status: CANCELLED | OUTPATIENT
Start: 2024-08-22

## 2024-08-23 ENCOUNTER — OFFICE VISIT (OUTPATIENT)
Dept: FAMILY MEDICINE CLINIC | Facility: CLINIC | Age: 64
End: 2024-08-23
Payer: COMMERCIAL

## 2024-08-23 ENCOUNTER — LAB (OUTPATIENT)
Dept: LAB | Facility: HOSPITAL | Age: 64
End: 2024-08-23
Payer: COMMERCIAL

## 2024-08-23 VITALS
HEIGHT: 65 IN | DIASTOLIC BLOOD PRESSURE: 72 MMHG | TEMPERATURE: 98.6 F | OXYGEN SATURATION: 90 % | BODY MASS INDEX: 46.75 KG/M2 | WEIGHT: 280.6 LBS | SYSTOLIC BLOOD PRESSURE: 124 MMHG | HEART RATE: 90 BPM | RESPIRATION RATE: 16 BRPM

## 2024-08-23 DIAGNOSIS — M79.89 SWELLING OF LIMB: Primary | ICD-10-CM

## 2024-08-23 DIAGNOSIS — M79.89 LEG SWELLING: ICD-10-CM

## 2024-08-23 DIAGNOSIS — F51.01 PRIMARY INSOMNIA: Primary | ICD-10-CM

## 2024-08-23 LAB
ALBUMIN SERPL-MCNC: 4.3 G/DL (ref 3.5–5.2)
ALBUMIN/GLOB SERPL: 1.5 G/DL
ALP SERPL-CCNC: 115 U/L (ref 39–117)
ALT SERPL W P-5'-P-CCNC: 19 U/L (ref 1–33)
ANION GAP SERPL CALCULATED.3IONS-SCNC: 13 MMOL/L (ref 5–15)
AST SERPL-CCNC: 21 U/L (ref 1–32)
BILIRUB SERPL-MCNC: 0.3 MG/DL (ref 0–1.2)
BUN SERPL-MCNC: 12 MG/DL (ref 8–23)
BUN/CREAT SERPL: 13.8 (ref 7–25)
CALCIUM SPEC-SCNC: 9.9 MG/DL (ref 8.6–10.5)
CHLORIDE SERPL-SCNC: 103 MMOL/L (ref 98–107)
CO2 SERPL-SCNC: 25 MMOL/L (ref 22–29)
CREAT SERPL-MCNC: 0.87 MG/DL (ref 0.57–1)
D DIMER PPP FEU-MCNC: 0.34 MCGFEU/ML (ref 0–0.65)
EGFRCR SERPLBLD CKD-EPI 2021: 74 ML/MIN/1.73
GLOBULIN UR ELPH-MCNC: 2.9 GM/DL
GLUCOSE SERPL-MCNC: 107 MG/DL (ref 65–99)
HBA1C MFR BLD: 6.3 % (ref 4.8–5.6)
NT-PROBNP SERPL-MCNC: 57.1 PG/ML (ref 0–900)
POTASSIUM SERPL-SCNC: 4.5 MMOL/L (ref 3.5–5.2)
PROT SERPL-MCNC: 7.2 G/DL (ref 6–8.5)
SODIUM SERPL-SCNC: 141 MMOL/L (ref 136–145)

## 2024-08-23 PROCEDURE — 85379 FIBRIN DEGRADATION QUANT: CPT

## 2024-08-23 PROCEDURE — 83880 ASSAY OF NATRIURETIC PEPTIDE: CPT

## 2024-08-23 PROCEDURE — 99214 OFFICE O/P EST MOD 30 MIN: CPT | Performed by: STUDENT IN AN ORGANIZED HEALTH CARE EDUCATION/TRAINING PROGRAM

## 2024-08-23 PROCEDURE — 36415 COLL VENOUS BLD VENIPUNCTURE: CPT

## 2024-08-23 PROCEDURE — 83036 HEMOGLOBIN GLYCOSYLATED A1C: CPT

## 2024-08-23 PROCEDURE — 80053 COMPREHEN METABOLIC PANEL: CPT

## 2024-08-23 RX ORDER — ZOLPIDEM TARTRATE 5 MG/1
5 TABLET ORAL NIGHTLY PRN
Qty: 30 TABLET | Refills: 1 | Status: SHIPPED | OUTPATIENT
Start: 2024-08-23

## 2024-08-26 NOTE — PROGRESS NOTES
Please let the patient know that the diabetic test (a1c) is elevated into the prediabetic not diabetic range. Recommend diet with lean meat fish and vegetables and decreased sugar/carbs with daily exercise. Can refer to phone dietitian if this helps let me know. Recommend to recheck this in 3-6 months.

## 2024-08-27 ENCOUNTER — TELEPHONE (OUTPATIENT)
Dept: FAMILY MEDICINE CLINIC | Facility: CLINIC | Age: 64
End: 2024-08-27
Payer: COMMERCIAL

## 2024-08-27 NOTE — TELEPHONE ENCOUNTER
Please let the patient know that the diabetic test (a1c) is elevated into the prediabetic not diabetic range. Recommend diet with lean meat fish and vegetables and decreased sugar/carbs with daily exercise. Can refer to phone dietitian if this helps let me know. Recommend to recheck this in 3-6 months.     Called patient and gave her results she voiced understanding.

## 2024-09-10 RX ORDER — QUETIAPINE FUMARATE 400 MG/1
400 TABLET, FILM COATED ORAL NIGHTLY
Qty: 30 TABLET | Refills: 0 | Status: SHIPPED | OUTPATIENT
Start: 2024-09-10

## 2024-09-23 DIAGNOSIS — F51.01 PRIMARY INSOMNIA: ICD-10-CM

## 2024-09-23 RX ORDER — ZOLPIDEM TARTRATE 5 MG/1
5 TABLET ORAL NIGHTLY PRN
Qty: 30 TABLET | Refills: 1 | OUTPATIENT
Start: 2024-09-23

## 2024-10-02 NOTE — TELEPHONE ENCOUNTER
Rx Refill Note   Pending Prescriptions:                       Disp   Refills     QUEtiapine (SEROquel) 400 MG tablet [Pharm*30 tab*0        Sig: TAKE 1 TABLET BY MOUTH ONCE DAILY AT NIGHT      Last office visit with prescribing clinician: 8/23/2024   Last telemedicine visit with prescribing clinician: 6/24/2024   Next office visit with prescribing clinician: 10/8/2024

## 2024-10-02 NOTE — TELEPHONE ENCOUNTER
Caller: Reshma Akhtar    Relationship: Self    Best call back number:   Telephone Information:   Mobile [phone]     What is the best time to reach you: ANYTIME    Who are you requesting to speak with (clinical staff, provider,  specific staff member): CLINICAL STAFF      What was the call regarding: PATIENT WONDERING WHAT THE DELAY IS IN GETTING HER MEDICATION REFILLED?

## 2024-10-04 RX ORDER — QUETIAPINE FUMARATE 400 MG/1
400 TABLET, FILM COATED ORAL NIGHTLY
Qty: 30 TABLET | Refills: 0 | OUTPATIENT
Start: 2024-10-04

## 2024-10-04 RX ORDER — QUETIAPINE FUMARATE 400 MG/1
400 TABLET, FILM COATED ORAL NIGHTLY
Qty: 30 TABLET | Refills: 0 | Status: SHIPPED | OUTPATIENT
Start: 2024-10-04

## 2024-10-08 PROBLEM — F51.04 PSYCHOPHYSIOLOGICAL INSOMNIA: Status: ACTIVE | Noted: 2024-10-08

## 2024-11-08 ENCOUNTER — TELEMEDICINE (OUTPATIENT)
Dept: FAMILY MEDICINE CLINIC | Facility: CLINIC | Age: 64
End: 2024-11-08
Payer: MEDICARE

## 2024-11-08 DIAGNOSIS — F51.04 PSYCHOPHYSIOLOGICAL INSOMNIA: Primary | ICD-10-CM

## 2024-11-08 PROCEDURE — 1159F MED LIST DOCD IN RCRD: CPT | Performed by: STUDENT IN AN ORGANIZED HEALTH CARE EDUCATION/TRAINING PROGRAM

## 2024-11-08 PROCEDURE — 1160F RVW MEDS BY RX/DR IN RCRD: CPT | Performed by: STUDENT IN AN ORGANIZED HEALTH CARE EDUCATION/TRAINING PROGRAM

## 2024-11-08 PROCEDURE — 99214 OFFICE O/P EST MOD 30 MIN: CPT | Performed by: STUDENT IN AN ORGANIZED HEALTH CARE EDUCATION/TRAINING PROGRAM

## 2024-11-08 RX ORDER — QUETIAPINE FUMARATE 300 MG/1
300 TABLET, FILM COATED ORAL NIGHTLY
Qty: 30 TABLET | Refills: 0 | Status: SHIPPED | OUTPATIENT
Start: 2024-11-08

## 2024-11-08 RX ORDER — TRAZODONE HYDROCHLORIDE 150 MG/1
300 TABLET ORAL NIGHTLY
Qty: 30 TABLET | Refills: 0 | Status: SHIPPED | OUTPATIENT
Start: 2024-11-08

## 2024-11-08 NOTE — PROGRESS NOTES
You have chosen to receive care through a telehealth visit.  Do you consent to use a video/audio connection for your medical care today? Yes       Patient and provider in [place].     Chief Complaint  Reshma Akhtar is a 64 y.o. female who presents via video-conference for insomnia    History of Present Illness      Insomnia  She has self increased to 3 300 mg seroquel and 3 trazodone 150 mg nightly.   She has not had any issue doing this. She feels this is effective for her. No unwanted side effects.       The following portions of the patient's history were reviewed and updated as appropriate: allergies, current medications, past family history, past medical history, past social history, past surgical history, and problem list.        Objective  Vital signs available: No      Assessment/Plan   Insomnia, severe    We will wean the seroquel to 300 mg nightly and trazodone to 300 mg nightly to stay within the fda limits of these meds for her safety to avoid any side effects in the future from very high doses. Will follow up with her in two weeks to consider adding another med to seroquel and further tapering trazodone. I gave her the number to call and schedule with sleep medicine for further evaluation.         Try to avoid ambien to lower risk of falls/dependence.    I asked her to let me know if she needs anything or has any issue.         No follow-ups on file.    Natasha Mccullough D.O.  [facility] [place]     15 minutes were spent reviewing the patient's questionnaire, formulating a treatment plan, and relaying information to the patient via [employer].

## 2024-11-12 RX ORDER — QUETIAPINE FUMARATE 300 MG/1
300 TABLET, FILM COATED ORAL NIGHTLY
Qty: 30 TABLET | Refills: 0 | Status: SHIPPED | OUTPATIENT
Start: 2024-11-12

## 2024-12-07 DIAGNOSIS — F51.04 PSYCHOPHYSIOLOGICAL INSOMNIA: ICD-10-CM

## 2024-12-09 RX ORDER — ROSUVASTATIN CALCIUM 10 MG/1
10 TABLET, COATED ORAL DAILY
Qty: 90 TABLET | Refills: 0 | Status: SHIPPED | OUTPATIENT
Start: 2024-12-09

## 2024-12-09 RX ORDER — TRAZODONE HYDROCHLORIDE 150 MG/1
300 TABLET ORAL NIGHTLY
Qty: 30 TABLET | Refills: 0 | Status: SHIPPED | OUTPATIENT
Start: 2024-12-09

## 2024-12-13 RX ORDER — QUETIAPINE FUMARATE 300 MG/1
300 TABLET, FILM COATED ORAL NIGHTLY
Qty: 30 TABLET | Refills: 0 | Status: SHIPPED | OUTPATIENT
Start: 2024-12-13

## 2024-12-30 DIAGNOSIS — F51.04 PSYCHOPHYSIOLOGICAL INSOMNIA: ICD-10-CM

## 2024-12-30 RX ORDER — QUETIAPINE FUMARATE 300 MG/1
300 TABLET, FILM COATED ORAL NIGHTLY
Qty: 30 TABLET | Refills: 0 | Status: SHIPPED | OUTPATIENT
Start: 2024-12-30

## 2024-12-30 RX ORDER — TRAZODONE HYDROCHLORIDE 150 MG/1
300 TABLET ORAL NIGHTLY
Qty: 30 TABLET | Refills: 0 | Status: SHIPPED | OUTPATIENT
Start: 2024-12-30

## 2025-01-23 DIAGNOSIS — F51.04 PSYCHOPHYSIOLOGICAL INSOMNIA: ICD-10-CM

## 2025-01-23 RX ORDER — TRAZODONE HYDROCHLORIDE 150 MG/1
300 TABLET ORAL NIGHTLY
Qty: 60 TABLET | Refills: 0 | Status: SHIPPED | OUTPATIENT
Start: 2025-01-23

## 2025-01-23 RX ORDER — QUETIAPINE FUMARATE 300 MG/1
300 TABLET, FILM COATED ORAL NIGHTLY
Qty: 30 TABLET | Refills: 0 | Status: SHIPPED | OUTPATIENT
Start: 2025-01-23

## 2025-02-17 DIAGNOSIS — F51.04 PSYCHOPHYSIOLOGICAL INSOMNIA: ICD-10-CM

## 2025-02-17 RX ORDER — TRAZODONE HYDROCHLORIDE 150 MG/1
300 TABLET ORAL NIGHTLY
Qty: 60 TABLET | Refills: 0 | Status: SHIPPED | OUTPATIENT
Start: 2025-02-17

## 2025-02-18 RX ORDER — QUETIAPINE FUMARATE 300 MG/1
300 TABLET, FILM COATED ORAL NIGHTLY
Qty: 30 TABLET | Refills: 0 | Status: SHIPPED | OUTPATIENT
Start: 2025-02-18

## 2025-03-04 RX ORDER — ROSUVASTATIN CALCIUM 10 MG/1
10 TABLET, COATED ORAL DAILY
Qty: 90 TABLET | Refills: 0 | Status: SHIPPED | OUTPATIENT
Start: 2025-03-04

## 2025-03-18 DIAGNOSIS — F51.04 PSYCHOPHYSIOLOGICAL INSOMNIA: ICD-10-CM

## 2025-03-18 RX ORDER — TRAZODONE HYDROCHLORIDE 150 MG/1
300 TABLET ORAL NIGHTLY
Qty: 60 TABLET | Refills: 0 | Status: SHIPPED | OUTPATIENT
Start: 2025-03-18

## 2025-03-20 ENCOUNTER — READMISSION MANAGEMENT (OUTPATIENT)
Dept: CALL CENTER | Facility: HOSPITAL | Age: 65
End: 2025-03-20
Payer: MEDICARE

## 2025-03-20 RX ORDER — QUETIAPINE FUMARATE 300 MG/1
300 TABLET, FILM COATED ORAL NIGHTLY
Qty: 30 TABLET | Refills: 0 | Status: SHIPPED | OUTPATIENT
Start: 2025-03-20

## 2025-03-20 RX ORDER — ROSUVASTATIN CALCIUM 10 MG/1
10 TABLET, COATED ORAL DAILY
Qty: 90 TABLET | Refills: 0 | Status: SHIPPED | OUTPATIENT
Start: 2025-03-20

## 2025-03-20 NOTE — OUTREACH NOTE
Prep Survey      Flowsheet Row Responses   [facility] patient discharged from? Non-[facility]   Is LACE score < 7 ? Non-BH Discharge   Eligibility [facility]   Date of Admission 03/16/25   Date of Discharge 03/20/25   Discharge Disposition Home or Self Care   Discharge diagnosis CHF (congestive heart failure),   Does the patient have one of the following disease processes/diagnoses(primary or secondary)? CHF   Prep survey completed? Yes            Marnie MURCIA - Registered Nurse

## 2025-03-21 ENCOUNTER — TRANSITIONAL CARE MANAGEMENT TELEPHONE ENCOUNTER (OUTPATIENT)
Dept: CALL CENTER | Facility: HOSPITAL | Age: 65
End: 2025-03-21
Payer: MEDICARE

## 2025-03-21 NOTE — OUTREACH NOTE
Call Center TCM Note      Flowsheet Row Responses   [facility] patient discharged from? Non-  [[facility]]   Does the patient have one of the following disease processes/diagnoses(primary or secondary)? CHF   TCM attempt successful? No   Unsuccessful attempts Attempt 1            Laly Breen RN    3/21/2025, 09:37 EDT

## 2025-03-21 NOTE — OUTREACH NOTE
Call Center TCM Note      Flowsheet Row Responses   [facility] patient discharged from? Non-  [[facility]]   Does the patient have one of the following disease processes/diagnoses(primary or secondary)? CHF   TCM attempt successful? Yes   Call start time 1442   Call end time 1448   Discharge diagnosis CHF (congestive heart failure),   Comments Hospital follow up with PCP 4/1.   Does the patient have an appointment with their PCP within 7-14 days of discharge? Yes   Psychosocial issues? No   Comments Patient states she is on 2L O2. Patient states she has ordered pulse oximeter that will be delivered today. Patient aware that if her O2 sat drops below 90% to seek immediate medical attention.   What is the patient's perception of their health status since discharge? Improving   Is the patient/caregiver able to teach back signs and symptoms related to disease process for when to call PCP? Yes   Is the patient/caregiver able to teach back signs and symptoms related to disease process for when to call 911? Yes   Is the patient/caregiver able to teach back the hierarchy of who to call/visit for symptoms/problems? PCP, Specialist, Home health nurse, Urgent Care, ED, 911 Yes   TCM call completed? Yes   Wrap up additional comments Patient reports improving. States she feels weak. Patient notices improvement with O2. Hospital follow up 4/1.   Call end time 1448   Would this patient benefit from a Referral to Amb Social Work? No   Is the patient interested in additional calls from an ambulatory ? No            Laly Breen, TANG    3/21/2025, 14:48 EDT

## 2025-03-27 RX ORDER — ONDANSETRON 4 MG/1
4 TABLET, ORALLY DISINTEGRATING ORAL EVERY 8 HOURS PRN
Qty: 10 TABLET | Refills: 0 | Status: SHIPPED | OUTPATIENT
Start: 2025-03-27

## 2025-03-27 RX ORDER — NYSTATIN AND TRIAMCINOLONE ACETONIDE 100000; 1 [USP'U]/G; MG/G
1 OINTMENT TOPICAL 2 TIMES DAILY
Qty: 1 G | Refills: 0 | Status: SHIPPED | OUTPATIENT
Start: 2025-03-27

## 2025-04-14 DIAGNOSIS — F51.04 PSYCHOPHYSIOLOGICAL INSOMNIA: ICD-10-CM

## 2025-04-14 RX ORDER — TRAZODONE HYDROCHLORIDE 150 MG/1
300 TABLET ORAL NIGHTLY
Qty: 60 TABLET | Refills: 2 | Status: SHIPPED | OUTPATIENT
Start: 2025-04-14

## 2025-04-22 RX ORDER — QUETIAPINE FUMARATE 300 MG/1
300 TABLET, FILM COATED ORAL NIGHTLY
Qty: 30 TABLET | Refills: 0 | Status: SHIPPED | OUTPATIENT
Start: 2025-04-22

## 2025-05-08 ENCOUNTER — TELEPHONE (OUTPATIENT)
Dept: FAMILY MEDICINE CLINIC | Facility: CLINIC | Age: 65
End: 2025-05-08
Payer: MEDICAID

## 2025-05-08 NOTE — TELEPHONE ENCOUNTER
Caller: Reshma Akhtar     Relationship: SELF    Best call back number: [phone]     What is your medical concern? PATIENT IS REQUESTING THE OFFICE TO REQUEST RECORDS FROM [facility].  PATIENT WAS IN THE HOSPITAL A FEW MONTHS AGO AND WAS IN THERE FOR 4 DAYS.   HOSPITAL TOLD PATIENT SHE HAD TO HAVE THE OFFICE REQUEST THEM.  PATIENT HAS AN APPOINTMENT ON 5/9/25 WITH DR. BRUNSON

## 2025-05-09 ENCOUNTER — OFFICE VISIT (OUTPATIENT)
Dept: FAMILY MEDICINE CLINIC | Facility: CLINIC | Age: 65
End: 2025-05-09
Payer: MEDICARE

## 2025-05-09 ENCOUNTER — LAB (OUTPATIENT)
Dept: LAB | Facility: HOSPITAL | Age: 65
End: 2025-05-09
Payer: MEDICAID

## 2025-05-09 DIAGNOSIS — F51.04 PSYCHOPHYSIOLOGICAL INSOMNIA: ICD-10-CM

## 2025-05-09 DIAGNOSIS — I50.22 CHRONIC SYSTOLIC (CONGESTIVE) HEART FAILURE: ICD-10-CM

## 2025-05-09 DIAGNOSIS — R93.89 ABNORMAL FINDINGS ON DIAGNOSTIC IMAGING OF OTHER SPECIFIED BODY STRUCTURES: ICD-10-CM

## 2025-05-09 DIAGNOSIS — L40.50 PSORIATIC ARTHRITIS: ICD-10-CM

## 2025-05-09 DIAGNOSIS — R11.2 NAUSEA AND VOMITING, UNSPECIFIED VOMITING TYPE: ICD-10-CM

## 2025-05-09 DIAGNOSIS — Z12.31 ENCOUNTER FOR SCREENING MAMMOGRAM FOR MALIGNANT NEOPLASM OF BREAST: Primary | ICD-10-CM

## 2025-05-09 DIAGNOSIS — H57.89 EYE DRAINAGE: ICD-10-CM

## 2025-05-09 DIAGNOSIS — R11.11 VOMITING WITHOUT NAUSEA, UNSPECIFIED VOMITING TYPE: ICD-10-CM

## 2025-05-09 LAB
DEPRECATED RDW RBC AUTO: 50.9 FL (ref 37–54)
ERYTHROCYTE [DISTWIDTH] IN BLOOD BY AUTOMATED COUNT: 14.4 % (ref 12.3–15.4)
ERYTHROCYTE [SEDIMENTATION RATE] IN BLOOD: 6 MM/HR (ref 0–30)
HCT VFR BLD AUTO: 42.4 % (ref 34–46.6)
HGB BLD-MCNC: 14 G/DL (ref 12–15.9)
MCH RBC QN AUTO: 32.3 PG (ref 26.6–33)
MCHC RBC AUTO-ENTMCNC: 33 G/DL (ref 31.5–35.7)
MCV RBC AUTO: 97.9 FL (ref 79–97)
NT-PROBNP SERPL-MCNC: 43.9 PG/ML (ref 0–900)
PLATELET # BLD AUTO: 306 10*3/MM3 (ref 140–450)
PMV BLD AUTO: 9.2 FL (ref 6–12)
RBC # BLD AUTO: 4.33 10*6/MM3 (ref 3.77–5.28)
WBC NRBC COR # BLD AUTO: 5.27 10*3/MM3 (ref 3.4–10.8)

## 2025-05-09 PROCEDURE — 84443 ASSAY THYROID STIM HORMONE: CPT

## 2025-05-09 PROCEDURE — 85027 COMPLETE CBC AUTOMATED: CPT

## 2025-05-09 PROCEDURE — 86431 RHEUMATOID FACTOR QUANT: CPT

## 2025-05-09 PROCEDURE — 85652 RBC SED RATE AUTOMATED: CPT

## 2025-05-09 PROCEDURE — 86140 C-REACTIVE PROTEIN: CPT

## 2025-05-09 PROCEDURE — 83880 ASSAY OF NATRIURETIC PEPTIDE: CPT

## 2025-05-09 PROCEDURE — 36415 COLL VENOUS BLD VENIPUNCTURE: CPT

## 2025-05-09 PROCEDURE — 83690 ASSAY OF LIPASE: CPT

## 2025-05-09 PROCEDURE — 86038 ANTINUCLEAR ANTIBODIES: CPT

## 2025-05-09 PROCEDURE — 80053 COMPREHEN METABOLIC PANEL: CPT

## 2025-05-09 RX ORDER — ERYTHROMYCIN 5 MG/G
OINTMENT OPHTHALMIC NIGHTLY
Qty: 1 EACH | Refills: 0 | Status: SHIPPED | OUTPATIENT
Start: 2025-05-09

## 2025-05-09 RX ORDER — QUETIAPINE FUMARATE 300 MG/1
300 TABLET, FILM COATED ORAL NIGHTLY
Qty: 90 TABLET | Refills: 3 | Status: SHIPPED | OUTPATIENT
Start: 2025-05-09

## 2025-05-09 RX ORDER — DULOXETIN HYDROCHLORIDE 30 MG/1
30 CAPSULE, DELAYED RELEASE ORAL DAILY
Qty: 60 CAPSULE | Refills: 0 | Status: SHIPPED | OUTPATIENT
Start: 2025-05-09

## 2025-05-09 RX ORDER — ROSUVASTATIN CALCIUM 10 MG/1
10 TABLET, COATED ORAL DAILY
Qty: 90 TABLET | Refills: 3 | Status: SHIPPED | OUTPATIENT
Start: 2025-05-09

## 2025-05-09 RX ORDER — TRAZODONE HYDROCHLORIDE 150 MG/1
300 TABLET ORAL NIGHTLY
Qty: 180 TABLET | Refills: 1 | Status: SHIPPED | OUTPATIENT
Start: 2025-05-09

## 2025-05-09 NOTE — PROGRESS NOTES
"Chief Complaint  Vomiting (For four months now, said she has a \"flare\" of this every year, denies diarrhea, would like a referral to Gastroenterology, would like to know if can keep taking the b12 injections)    Vomiting       The patient has IBS . She says she has a flare once yearly and has had this for 15-20 years. She says she treats this with avoiding gluten. She says this last weekend she had another episode of vomiting but this resolve. Today she feels well. No blood in stool constipation diarrhea fever. No belly pain or vomiting now. No symptoms of heart failure such as sob cp or  swelling in the legs.     Anxiety/insomnia  She reports having a lot of anxiety lately as her brothers are \"kicking me out\" of the home. She says her brother has been not very kind to her. She reports this has made her very down and she has not been cleaning after herself . Her mood got better after finding some apartments this week. She was on prozac in the past and it gave her flat affect. She says current sleeping regimen is \"spot on \" to help anxiety.     She is concerned for uti.       She has been having drainage in her left eye only without vision changes for months she wakes up with the eye matted shut.     No other complaints today.     The following portions of the patient's history were reviewed and updated as appropriate: allergies, current medications, past family history, past medical history, past social history, past surgical history, and problem list.    OBJECTIVE:  There were no vitals taken for this visit.      Physical Exam  Constitutional:       General: She is not in acute distress.     Appearance: Normal appearance.      Comments: Left eye is red perrla  No drainage seen today normal movements no eyelid or skin changes   HENT:      Head: Normocephalic and atraumatic.   Eyes:      Extraocular Movements: Extraocular movements intact.   Cardiovascular:      Rate and Rhythm: Normal rate and regular rhythm.      " Heart sounds: No murmur heard.  Pulmonary:      Effort: Pulmonary effort is normal. No respiratory distress.      Breath sounds: Normal breath sounds. No stridor. No wheezing, rhonchi or rales.   Abdominal:      General: Bowel sounds are normal.      Palpations: Abdomen is soft.      Tenderness: There is no abdominal tenderness. There is no guarding or rebound.   Skin:     Findings: No rash.   Neurological:      General: No focal deficit present.      Mental Status: She is alert.   Psychiatric:         Mood and Affect: Mood normal.                  Assessment and Plan   Diagnoses and all orders for this visit:    1. Encounter for screening mammogram for malignant neoplasm of breast (Primary)  -     Mammo screening digital tomosynthesis bilateral w CAD; Future    2. Vomiting without nausea, unspecified vomiting type  -     Ambulatory Referral to Gastroenterology    3. Nausea and vomiting, unspecified vomiting type  -     CBC (No Diff); Future  -     Comprehensive Metabolic Panel; Future  -     TSH Rfx On Abnormal To Free T4; Future  -     Lipase; Future  -     proBNP; Future  -     Urinalysis With Culture If Indicated -; Future    4. Abnormal findings on diagnostic imaging of other specified body structures  -     TSH Rfx On Abnormal To Free T4; Future    5. Psoriatic arthritis  -     Rheumatoid Factor, Quant; Future  -     LEVY; Future  -     Sedimentation rate, automated; Future  -     C-reactive protein; Future    6. Psychophysiological insomnia  -     traZODone (DESYREL) 150 MG tablet; Take 2 tablets by mouth Every Night.  Dispense: 180 tablet; Refill: 1    7. Chronic systolic (congestive) heart failure  -     proBNP; Future    Other orders  -     DULoxetine (CYMBALTA) 30 MG capsule; Take 1 capsule by mouth Daily.  Dispense: 60 capsule; Refill: 0  -     QUEtiapine (SEROquel) 300 MG tablet; Take 1 tablet by mouth Every Night.  Dispense: 90 tablet; Refill: 3  -     rosuvastatin (CRESTOR) 10 MG tablet; Take 1 tablet  by mouth Daily.  Dispense: 90 tablet; Refill: 3      Belly pain  Resolved  Check labs and refer to gastro  I asked her to seek care asap if belly pain or vomiting recurs    Depression  Start on cymbalta. Counseled on risks of this medication including worsening nausea/vomiting, serotonin syndrome which would be mood changes elevated pulse /bp. Counseled on risks of suicidal thoughts, she denies any at this time. Will see her back in a month to check on this.     Treat for possible conjunctivitis , refer to optho given timeline of symptoms.     Return in about 1 month (around 6/9/2025).       Natasha Mccullough D.O.  [facility] [place]

## 2025-05-10 LAB
ALBUMIN SERPL-MCNC: 4 G/DL (ref 3.5–5.2)
ALBUMIN/GLOB SERPL: 1.4 G/DL
ALP SERPL-CCNC: 62 U/L (ref 39–117)
ALT SERPL W P-5'-P-CCNC: 27 U/L (ref 1–33)
ANION GAP SERPL CALCULATED.3IONS-SCNC: 11 MMOL/L (ref 5–15)
AST SERPL-CCNC: 29 U/L (ref 1–32)
BILIRUB SERPL-MCNC: 0.3 MG/DL (ref 0–1.2)
BUN SERPL-MCNC: 14 MG/DL (ref 8–23)
BUN/CREAT SERPL: 21.5 (ref 7–25)
CALCIUM SPEC-SCNC: 8.7 MG/DL (ref 8.6–10.5)
CHLORIDE SERPL-SCNC: 103 MMOL/L (ref 98–107)
CHROMATIN AB SERPL-ACNC: <10 IU/ML (ref 0–14)
CO2 SERPL-SCNC: 26 MMOL/L (ref 22–29)
CREAT SERPL-MCNC: 0.65 MG/DL (ref 0.57–1)
CRP SERPL-MCNC: <0.3 MG/DL (ref 0–0.5)
EGFRCR SERPLBLD CKD-EPI 2021: 97.8 ML/MIN/1.73
GLOBULIN UR ELPH-MCNC: 2.9 GM/DL
GLUCOSE SERPL-MCNC: 116 MG/DL (ref 65–99)
LIPASE SERPL-CCNC: 34 U/L (ref 13–60)
POTASSIUM SERPL-SCNC: 4 MMOL/L (ref 3.5–5.2)
PROT SERPL-MCNC: 6.9 G/DL (ref 6–8.5)
SODIUM SERPL-SCNC: 140 MMOL/L (ref 136–145)
TSH SERPL DL<=0.05 MIU/L-ACNC: 0.95 UIU/ML (ref 0.27–4.2)

## 2025-05-12 ENCOUNTER — TELEPHONE (OUTPATIENT)
Dept: FAMILY MEDICINE CLINIC | Facility: CLINIC | Age: 65
End: 2025-05-12
Payer: MEDICAID

## 2025-05-12 DIAGNOSIS — L40.50 PSORIATIC ARTHRITIS: Primary | ICD-10-CM

## 2025-05-12 LAB — ANA SER QL: NEGATIVE

## 2025-05-12 NOTE — TELEPHONE ENCOUNTER
Caller: Reshma Akhtar    Relationship: Self    Best call back number: [phone]     What medication are you requesting: PHENERGAN    What are your current symptoms: VOMITING    How long have you been experiencing symptoms: SEVERAL MONTHS    Have you had these symptoms before:    [] Yes  [x] No    Have you been treated for these symptoms before:   [] Yes  [x] No    If a prescription is needed, what is your preferred pharmacy and phone number: [facility] [address] [place] [address] [phone] [facility] [phone]      Additional notes:  PATIENT STATES SHE DISCUSSED GETTING PHENERGAN AT APPOINTMENT ON 5.9.25, AND WOULD LIKE PHENERGAN TO TREAT ONGOING SYMPTOMS OF VOMITING IN THE EVENING.

## 2025-05-12 NOTE — TELEPHONE ENCOUNTER
Caller: Reshma Akhtar    Relationship: Self    Best call back number: [phone]     What is the best time to reach you: ANYTIME    Who are you requesting to speak with (clinical staff, provider,  specific staff member): CLINICAL    Do you know the name of the person who called: PATIENT    What was the call regarding: PATIENT IS CALLING TO CHECK THE STATUS OF THE REFERRAL TO GASTROENTEROLOGY.    Is it okay if the provider responds through MyChart: YES

## 2025-05-13 NOTE — TELEPHONE ENCOUNTER
Please let her know there is a drug interaction with phenergan and seroquel with trazodone . Recommend ayush over the counter and come in for a visit if vomiting continues.

## 2025-05-13 NOTE — TELEPHONE ENCOUNTER
HUB TO RELAY    LVM. Per Dr. Mccullough:   Please let her know there is a drug interaction with phenergan and seroquel with trazodone . Recommend ayush over the counter and come in for a visit if vomiting continues.

## 2025-05-18 ENCOUNTER — APPOINTMENT (OUTPATIENT)
Facility: HOSPITAL | Age: 65
End: 2025-05-18
Payer: MEDICARE

## 2025-05-18 ENCOUNTER — HOSPITAL ENCOUNTER (INPATIENT)
Facility: HOSPITAL | Age: 65
LOS: 3 days | Discharge: HOME | End: 2025-05-22
Attending: STUDENT IN AN ORGANIZED HEALTH CARE EDUCATION/TRAINING PROGRAM | Admitting: STUDENT IN AN ORGANIZED HEALTH CARE EDUCATION/TRAINING PROGRAM
Payer: MEDICARE

## 2025-05-18 ENCOUNTER — APPOINTMENT (OUTPATIENT)
Facility: HOSPITAL | Age: 65
End: 2025-05-18
Payer: MEDICAID

## 2025-05-18 DIAGNOSIS — I50.9 ACUTE ON CHRONIC CONGESTIVE HEART FAILURE, UNSPECIFIED HEART FAILURE TYPE: Primary | ICD-10-CM

## 2025-05-18 DIAGNOSIS — R06.09 DYSPNEA ON EXERTION: ICD-10-CM

## 2025-05-18 DIAGNOSIS — R00.2 PALPITATIONS: ICD-10-CM

## 2025-05-18 DIAGNOSIS — J96.01 ACUTE HYPOXIC RESPIRATORY FAILURE: ICD-10-CM

## 2025-05-18 DIAGNOSIS — R09.02 HYPOXIA: ICD-10-CM

## 2025-05-18 DIAGNOSIS — G47.34 NOCTURNAL HYPOXIA: ICD-10-CM

## 2025-05-18 DIAGNOSIS — R51.9 NONINTRACTABLE EPISODIC HEADACHE, UNSPECIFIED HEADACHE TYPE: ICD-10-CM

## 2025-05-18 DIAGNOSIS — I50.9 CONGESTIVE HEART FAILURE, UNSPECIFIED HF CHRONICITY, UNSPECIFIED HEART FAILURE TYPE: ICD-10-CM

## 2025-05-18 LAB
ALBUMIN SERPL-MCNC: 3.8 G/DL (ref 3.5–5.2)
ALBUMIN/GLOB SERPL: 1.3 G/DL
ALP SERPL-CCNC: 69 U/L (ref 39–117)
ALT SERPL W P-5'-P-CCNC: 13 U/L (ref 1–33)
AMPHET+METHAMPHET UR QL: NEGATIVE
AMPHETAMINES UR QL: NEGATIVE
ANION GAP SERPL CALCULATED.3IONS-SCNC: 9.6 MMOL/L (ref 5–15)
ARTERIAL PATENCY WRIST A: ABNORMAL
AST SERPL-CCNC: 17 U/L (ref 1–32)
ATMOSPHERIC PRESS: ABNORMAL MM[HG]
B PARAPERT DNA SPEC QL NAA+PROBE: NOT DETECTED
B PERT DNA SPEC QL NAA+PROBE: NOT DETECTED
BARBITURATES UR QL SCN: NEGATIVE
BASE EXCESS BLDA CALC-SCNC: 7.1 MMOL/L (ref 0–2)
BASOPHILS # BLD AUTO: 0.01 10*3/MM3 (ref 0–0.2)
BASOPHILS NFR BLD AUTO: 0.2 % (ref 0–1.5)
BDY SITE: ABNORMAL
BENZODIAZ UR QL SCN: NEGATIVE
BILIRUB SERPL-MCNC: 0.4 MG/DL (ref 0–1.2)
BILIRUB UR QL STRIP: ABNORMAL
BODY TEMPERATURE: 37
BUN SERPL-MCNC: 9 MG/DL (ref 8–23)
BUN/CREAT SERPL: 13.4 (ref 7–25)
BUPRENORPHINE SERPL-MCNC: NEGATIVE NG/ML
C PNEUM DNA NPH QL NAA+NON-PROBE: NOT DETECTED
CALCIUM SPEC-SCNC: 9.4 MG/DL (ref 8.6–10.5)
CANNABINOIDS SERPL QL: NEGATIVE
CHLORIDE SERPL-SCNC: 102 MMOL/L (ref 98–107)
CLARITY UR: CLEAR
CO2 BLDA-SCNC: 35 MMOL/L (ref 22–33)
CO2 SERPL-SCNC: 28.4 MMOL/L (ref 22–29)
COCAINE UR QL: NEGATIVE
COHGB MFR BLD: 1.7 % (ref 0–2)
COLOR UR: YELLOW
CREAT SERPL-MCNC: 0.67 MG/DL (ref 0.57–1)
DEPRECATED RDW RBC AUTO: 54.7 FL (ref 37–54)
EGFRCR SERPLBLD CKD-EPI 2021: 97.1 ML/MIN/1.73
EOSINOPHIL # BLD AUTO: 0 10*3/MM3 (ref 0–0.4)
EOSINOPHIL NFR BLD AUTO: 0 % (ref 0.3–6.2)
ERYTHROCYTE [DISTWIDTH] IN BLOOD BY AUTOMATED COUNT: 14.9 % (ref 12.3–15.4)
ETHANOL BLD-MCNC: <10 MG/DL (ref 0–10)
FENTANYL UR-MCNC: NEGATIVE NG/ML
FLUAV SUBTYP SPEC NAA+PROBE: NOT DETECTED
FLUBV RNA ISLT QL NAA+PROBE: NOT DETECTED
GEN 5 1HR TROPONIN T REFLEX: 16 NG/L
GLOBULIN UR ELPH-MCNC: 3 GM/DL
GLUCOSE SERPL-MCNC: 144 MG/DL (ref 65–99)
GLUCOSE UR STRIP-MCNC: NEGATIVE MG/DL
HADV DNA SPEC NAA+PROBE: NOT DETECTED
HCO3 BLDA-SCNC: 33.4 MMOL/L (ref 20–26)
HCOV 229E RNA SPEC QL NAA+PROBE: NOT DETECTED
HCOV HKU1 RNA SPEC QL NAA+PROBE: NOT DETECTED
HCOV NL63 RNA SPEC QL NAA+PROBE: NOT DETECTED
HCOV OC43 RNA SPEC QL NAA+PROBE: NOT DETECTED
HCT VFR BLD AUTO: 44.9 % (ref 34–46.6)
HCT VFR BLD CALC: 46 %
HGB BLD-MCNC: 14.4 G/DL (ref 12–15.9)
HGB BLDA-MCNC: 15 G/DL (ref 14–18)
HGB UR QL STRIP.AUTO: NEGATIVE
HMPV RNA NPH QL NAA+NON-PROBE: NOT DETECTED
HPIV1 RNA ISLT QL NAA+PROBE: NOT DETECTED
HPIV2 RNA SPEC QL NAA+PROBE: NOT DETECTED
HPIV3 RNA NPH QL NAA+PROBE: NOT DETECTED
HPIV4 P GENE NPH QL NAA+PROBE: NOT DETECTED
IMM GRANULOCYTES # BLD AUTO: 0.05 10*3/MM3 (ref 0–0.05)
IMM GRANULOCYTES NFR BLD AUTO: 0.8 % (ref 0–0.5)
INHALED O2 CONCENTRATION: 30 %
KETONES UR QL STRIP: ABNORMAL
LEUKOCYTE ESTERASE UR QL STRIP.AUTO: NEGATIVE
LIPASE SERPL-CCNC: 12 U/L (ref 13–60)
LYMPHOCYTES # BLD AUTO: 0.87 10*3/MM3 (ref 0.7–3.1)
LYMPHOCYTES NFR BLD AUTO: 13.2 % (ref 19.6–45.3)
M PNEUMO IGG SER IA-ACNC: NOT DETECTED
MAGNESIUM SERPL-MCNC: 1.9 MG/DL (ref 1.6–2.4)
MCH RBC QN AUTO: 31.9 PG (ref 26.6–33)
MCHC RBC AUTO-ENTMCNC: 32.1 G/DL (ref 31.5–35.7)
MCV RBC AUTO: 99.3 FL (ref 79–97)
METHADONE UR QL SCN: NEGATIVE
METHGB BLD QL: 0.7 % (ref 0–1.5)
MODALITY: ABNORMAL
MONOCYTES # BLD AUTO: 0.2 10*3/MM3 (ref 0.1–0.9)
MONOCYTES NFR BLD AUTO: 3 % (ref 5–12)
NEUTROPHILS NFR BLD AUTO: 5.48 10*3/MM3 (ref 1.7–7)
NEUTROPHILS NFR BLD AUTO: 82.8 % (ref 42.7–76)
NITRITE UR QL STRIP: NEGATIVE
NT-PROBNP SERPL-MCNC: 1327 PG/ML (ref 0–900)
OPIATES UR QL: NEGATIVE
OXYCODONE UR QL SCN: NEGATIVE
OXYHGB MFR BLDV: 93 % (ref 94–99)
PCO2 BLDA: 52.3 MM HG (ref 35–45)
PCO2 TEMP ADJ BLD: 52.3 MM HG (ref 35–45)
PCP UR QL SCN: NEGATIVE
PH BLDA: 7.41 PH UNITS (ref 7.35–7.45)
PH UR STRIP.AUTO: 6 [PH] (ref 5–8)
PH, TEMP CORRECTED: 7.41 PH UNITS
PLATELET # BLD AUTO: 259 10*3/MM3 (ref 140–450)
PMV BLD AUTO: 8.7 FL (ref 6–12)
PO2 BLDA: 73.4 MM HG (ref 83–108)
PO2 TEMP ADJ BLD: 73.4 MM HG (ref 83–108)
POTASSIUM SERPL-SCNC: 4.2 MMOL/L (ref 3.5–5.2)
PROT SERPL-MCNC: 6.8 G/DL (ref 6–8.5)
PROT UR QL STRIP: ABNORMAL
RBC # BLD AUTO: 4.52 10*6/MM3 (ref 3.77–5.28)
RHINOVIRUS RNA SPEC NAA+PROBE: NOT DETECTED
RSV RNA NPH QL NAA+NON-PROBE: NOT DETECTED
SARS-COV-2 RNA RESP QL NAA+PROBE: NOT DETECTED
SODIUM SERPL-SCNC: 140 MMOL/L (ref 136–145)
SP GR UR STRIP: >=1.03 (ref 1–1.03)
TRICYCLICS UR QL SCN: POSITIVE
TROPONIN T NUMERIC DELTA: 3 NG/L
TROPONIN T SERPL HS-MCNC: 13 NG/L
TROPONIN T SERPL HS-MCNC: 16 NG/L
UROBILINOGEN UR QL STRIP: ABNORMAL
VENTILATOR MODE: ABNORMAL
WBC NRBC COR # BLD AUTO: 6.61 10*3/MM3 (ref 3.4–10.8)

## 2025-05-18 PROCEDURE — 25010000002 KETOROLAC TROMETHAMINE PER 15 MG

## 2025-05-18 PROCEDURE — 25510000001 IOPAMIDOL PER 1 ML: Performed by: STUDENT IN AN ORGANIZED HEALTH CARE EDUCATION/TRAINING PROGRAM

## 2025-05-18 PROCEDURE — 71275 CT ANGIOGRAPHY CHEST: CPT

## 2025-05-18 PROCEDURE — G0378 HOSPITAL OBSERVATION PER HR: HCPCS

## 2025-05-18 PROCEDURE — 93005 ELECTROCARDIOGRAM TRACING: CPT

## 2025-05-18 PROCEDURE — 80307 DRUG TEST PRSMV CHEM ANLYZR: CPT

## 2025-05-18 PROCEDURE — 25010000002 ONDANSETRON PER 1 MG

## 2025-05-18 PROCEDURE — 0202U NFCT DS 22 TRGT SARS-COV-2: CPT

## 2025-05-18 PROCEDURE — 83880 ASSAY OF NATRIURETIC PEPTIDE: CPT

## 2025-05-18 PROCEDURE — 83050 HGB METHEMOGLOBIN QUAN: CPT

## 2025-05-18 PROCEDURE — 82077 ASSAY SPEC XCP UR&BREATH IA: CPT

## 2025-05-18 PROCEDURE — 80053 COMPREHEN METABOLIC PANEL: CPT

## 2025-05-18 PROCEDURE — 36600 WITHDRAWAL OF ARTERIAL BLOOD: CPT

## 2025-05-18 PROCEDURE — 99285 EMERGENCY DEPT VISIT HI MDM: CPT | Performed by: STUDENT IN AN ORGANIZED HEALTH CARE EDUCATION/TRAINING PROGRAM

## 2025-05-18 PROCEDURE — 71045 X-RAY EXAM CHEST 1 VIEW: CPT

## 2025-05-18 PROCEDURE — 36415 COLL VENOUS BLD VENIPUNCTURE: CPT

## 2025-05-18 PROCEDURE — 85025 COMPLETE CBC W/AUTO DIFF WBC: CPT

## 2025-05-18 PROCEDURE — 82375 ASSAY CARBOXYHB QUANT: CPT

## 2025-05-18 PROCEDURE — 81003 URINALYSIS AUTO W/O SCOPE: CPT

## 2025-05-18 PROCEDURE — 84484 ASSAY OF TROPONIN QUANT: CPT

## 2025-05-18 PROCEDURE — 83735 ASSAY OF MAGNESIUM: CPT

## 2025-05-18 PROCEDURE — 82805 BLOOD GASES W/O2 SATURATION: CPT

## 2025-05-18 PROCEDURE — 99223 1ST HOSP IP/OBS HIGH 75: CPT | Performed by: FAMILY MEDICINE

## 2025-05-18 PROCEDURE — 74177 CT ABD & PELVIS W/CONTRAST: CPT

## 2025-05-18 PROCEDURE — 25010000002 BUMETANIDE PER 0.5 MG

## 2025-05-18 PROCEDURE — 70450 CT HEAD/BRAIN W/O DYE: CPT

## 2025-05-18 PROCEDURE — 83690 ASSAY OF LIPASE: CPT

## 2025-05-18 RX ORDER — BISACODYL 5 MG/1
5 TABLET, DELAYED RELEASE ORAL DAILY PRN
Status: DISCONTINUED | OUTPATIENT
Start: 2025-05-18 | End: 2025-05-22 | Stop reason: HOSPADM

## 2025-05-18 RX ORDER — QUETIAPINE FUMARATE 100 MG/1
300 TABLET, FILM COATED ORAL NIGHTLY
Status: DISCONTINUED | OUTPATIENT
Start: 2025-05-18 | End: 2025-05-22 | Stop reason: HOSPADM

## 2025-05-18 RX ORDER — SODIUM CHLORIDE 0.9 % (FLUSH) 0.9 %
10 SYRINGE (ML) INJECTION AS NEEDED
Status: DISCONTINUED | OUTPATIENT
Start: 2025-05-18 | End: 2025-05-22 | Stop reason: HOSPADM

## 2025-05-18 RX ORDER — KETOROLAC TROMETHAMINE 15 MG/ML
15 INJECTION, SOLUTION INTRAMUSCULAR; INTRAVENOUS EVERY 6 HOURS PRN
Status: DISCONTINUED | OUTPATIENT
Start: 2025-05-18 | End: 2025-05-19

## 2025-05-18 RX ORDER — POLYETHYLENE GLYCOL 3350 17 G/17G
17 POWDER, FOR SOLUTION ORAL DAILY PRN
Status: DISCONTINUED | OUTPATIENT
Start: 2025-05-18 | End: 2025-05-22 | Stop reason: HOSPADM

## 2025-05-18 RX ORDER — BUMETANIDE 0.25 MG/ML
1 INJECTION, SOLUTION INTRAMUSCULAR; INTRAVENOUS ONCE
Status: COMPLETED | OUTPATIENT
Start: 2025-05-18 | End: 2025-05-18

## 2025-05-18 RX ORDER — ONDANSETRON 2 MG/ML
4 INJECTION INTRAMUSCULAR; INTRAVENOUS EVERY 6 HOURS PRN
Status: DISCONTINUED | OUTPATIENT
Start: 2025-05-18 | End: 2025-05-22 | Stop reason: HOSPADM

## 2025-05-18 RX ORDER — BUMETANIDE 0.25 MG/ML
2 INJECTION, SOLUTION INTRAMUSCULAR; INTRAVENOUS ONCE
Status: DISCONTINUED | OUTPATIENT
Start: 2025-05-18 | End: 2025-05-18

## 2025-05-18 RX ORDER — AMOXICILLIN 250 MG
2 CAPSULE ORAL 2 TIMES DAILY PRN
Status: DISCONTINUED | OUTPATIENT
Start: 2025-05-18 | End: 2025-05-22 | Stop reason: HOSPADM

## 2025-05-18 RX ORDER — ONDANSETRON 2 MG/ML
4 INJECTION INTRAMUSCULAR; INTRAVENOUS ONCE
Status: COMPLETED | OUTPATIENT
Start: 2025-05-18 | End: 2025-05-18

## 2025-05-18 RX ORDER — ROSUVASTATIN CALCIUM 10 MG/1
10 TABLET, COATED ORAL DAILY
Status: DISCONTINUED | OUTPATIENT
Start: 2025-05-19 | End: 2025-05-22 | Stop reason: HOSPADM

## 2025-05-18 RX ORDER — IOPAMIDOL 755 MG/ML
100 INJECTION, SOLUTION INTRAVASCULAR
Status: COMPLETED | OUTPATIENT
Start: 2025-05-18 | End: 2025-05-18

## 2025-05-18 RX ORDER — SODIUM CHLORIDE 0.9 % (FLUSH) 0.9 %
10 SYRINGE (ML) INJECTION EVERY 12 HOURS SCHEDULED
Status: DISCONTINUED | OUTPATIENT
Start: 2025-05-18 | End: 2025-05-22 | Stop reason: HOSPADM

## 2025-05-18 RX ORDER — BUMETANIDE 0.25 MG/ML
1 INJECTION, SOLUTION INTRAMUSCULAR; INTRAVENOUS EVERY 12 HOURS
Status: DISCONTINUED | OUTPATIENT
Start: 2025-05-19 | End: 2025-05-21

## 2025-05-18 RX ORDER — SODIUM CHLORIDE 9 MG/ML
40 INJECTION, SOLUTION INTRAVENOUS AS NEEDED
Status: DISCONTINUED | OUTPATIENT
Start: 2025-05-18 | End: 2025-05-22 | Stop reason: HOSPADM

## 2025-05-18 RX ORDER — ENOXAPARIN SODIUM 100 MG/ML
40 INJECTION SUBCUTANEOUS EVERY 12 HOURS
Status: DISCONTINUED | OUTPATIENT
Start: 2025-05-19 | End: 2025-05-22 | Stop reason: HOSPADM

## 2025-05-18 RX ORDER — ACETAMINOPHEN 500 MG
1000 TABLET ORAL ONCE
Status: COMPLETED | OUTPATIENT
Start: 2025-05-18 | End: 2025-05-18

## 2025-05-18 RX ORDER — BISACODYL 10 MG
10 SUPPOSITORY, RECTAL RECTAL DAILY PRN
Status: DISCONTINUED | OUTPATIENT
Start: 2025-05-18 | End: 2025-05-22 | Stop reason: HOSPADM

## 2025-05-18 RX ORDER — DULOXETIN HYDROCHLORIDE 30 MG/1
30 CAPSULE, DELAYED RELEASE ORAL DAILY
Status: DISCONTINUED | OUTPATIENT
Start: 2025-05-19 | End: 2025-05-19

## 2025-05-18 RX ORDER — PANTOPRAZOLE SODIUM 40 MG/1
40 TABLET, DELAYED RELEASE ORAL
Status: DISCONTINUED | OUTPATIENT
Start: 2025-05-19 | End: 2025-05-22 | Stop reason: HOSPADM

## 2025-05-18 RX ORDER — IPRATROPIUM BROMIDE AND ALBUTEROL SULFATE 2.5; .5 MG/3ML; MG/3ML
3 SOLUTION RESPIRATORY (INHALATION) EVERY 4 HOURS PRN
Status: DISCONTINUED | OUTPATIENT
Start: 2025-05-18 | End: 2025-05-22 | Stop reason: HOSPADM

## 2025-05-18 RX ADMIN — BUMETANIDE 1 MG: 0.25 INJECTION INTRAMUSCULAR; INTRAVENOUS at 18:27

## 2025-05-18 RX ADMIN — IOPAMIDOL 100 ML: 755 INJECTION, SOLUTION INTRAVENOUS at 17:08

## 2025-05-18 RX ADMIN — ACETAMINOPHEN 1000 MG: 500 TABLET, FILM COATED ORAL at 15:17

## 2025-05-18 RX ADMIN — BUMETANIDE 1 MG: 0.25 INJECTION INTRAMUSCULAR; INTRAVENOUS at 16:40

## 2025-05-18 RX ADMIN — ONDANSETRON 4 MG: 2 INJECTION INTRAMUSCULAR; INTRAVENOUS at 15:16

## 2025-05-18 RX ADMIN — TRAZODONE HYDROCHLORIDE 150 MG: 50 TABLET ORAL at 23:46

## 2025-05-18 RX ADMIN — Medication 10 ML: at 23:46

## 2025-05-18 RX ADMIN — QUETIAPINE FUMARATE 300 MG: 100 TABLET ORAL at 23:46

## 2025-05-18 RX ADMIN — KETOROLAC TROMETHAMINE 15 MG: 15 INJECTION, SOLUTION INTRAMUSCULAR; INTRAVENOUS at 18:03

## 2025-05-18 NOTE — ED NOTES
Reshma Akhtar    Nursing Report ED to Floor:  Mental status: aox4  Ambulatory status: ambulatory  Oxygen Therapy:  4l nc  Cardiac Rhythm: nsr  Admitted from: home  Safety Concerns:  hypoxia  Precautions: na  Social Issues: na  ED Room #:  4    ED Nurse Phone Extension - [phone] or may call [phone].      HPI:   Chief Complaint   Patient presents with    Nausea    Vomiting    Irritable Bowel Syndrome       Past Medical History:  Past Medical History:   Diagnosis Date    Arthritis     Degenerative disc disease, lumbar     GERD (gastroesophageal reflux disease)         Past Surgical History:  Past Surgical History:   Procedure Laterality Date    CHOLECYSTECTOMY      OOPHORECTOMY          Admitting Doctor:   No admitting provider for patient encounter.    Consulting Provider(s):  Consults       No orders found from 4/19/2025 to 5/19/2025.             Admitting Diagnosis:   The primary encounter diagnosis was Acute on chronic congestive heart failure, unspecified heart failure type. A diagnosis of Acute hypoxic respiratory failure was also pertinent to this visit.    Most Recent Vitals:   Vitals:    05/18/25 1530 05/18/25 1600 05/18/25 1630 05/18/25 1804   BP: 128/87 125/62 123/87 165/77   BP Location:       Patient Position:       Pulse: 70 79 65 64   Resp:       Temp:       TempSrc:       SpO2: 95% 98% 96% 93%   Weight:       Height:           Active LDAs/IV Access:   Lines, Drains & Airways       Active LDAs       Name Placement date Placement time Site Days    Peripheral IV 05/18/25 1516 20 G Posterior;Right Hand 05/18/25  1516  Hand  less than 1    Peripheral IV 05/18/25 1644 18 G Left Antecubital 05/18/25  1644  Antecubital  less than 1                    Labs (abnormal labs have a star):   Labs Reviewed   COMPREHENSIVE METABOLIC PANEL - Abnormal; Notable for the following components:       Result Value    Glucose 144 (*)     All other components within normal limits    Narrative:     GFR Categories in Chronic  Kidney Disease (CKD)              GFR Category          GFR (mL/min/1.73)    Interpretation  G1                    90 or greater        Normal or high (1)  G2                    60-89                Mild decrease (1)  G3a                   45-59                Mild to moderate decrease  G3b                   30-44                Moderate to severe decrease  G4                    15-29                Severe decrease  G5                    14 or less           Kidney failure    (1)In the absence of evidence of kidney disease, neither GFR category G1 or G2 fulfill the criteria for CKD.    eGFR calculation 2021 CKD-EPI creatinine equation, which does not include race as a factor   URINALYSIS W/ MICROSCOPIC IF INDICATED (NO CULTURE) - Abnormal; Notable for the following components:    Ketones, UA 40 mg/dL (2+) (*)     Bilirubin, UA Small (1+) (*)     Protein, UA Trace (*)     All other components within normal limits    Narrative:     Urine microscopic not indicated.   LIPASE - Abnormal; Notable for the following components:    Lipase 12 (*)     All other components within normal limits   URINE DRUG SCREEN - Abnormal; Notable for the following components:    Tricyclic Antidepressants Screen Positive (*)     All other components within normal limits    Narrative:     Cutoff For Drugs Screened:    Amphetamines               500 ng/ml  Barbiturates               200 ng/ml  Benzodiazepines            150 ng/ml  Cocaine                    150 ng/ml  Methadone                  200 ng/ml  Opiates                    100 ng/ml  Phencyclidine               25 ng/ml  THC                         50 ng/ml  Methamphetamine            500 ng/ml  Tricyclic Antidepressants  300 ng/ml  Oxycodone                  100 ng/ml  Buprenorphine               10 ng/ml    The normal value for all drugs tested is negative. This report includes unconfirmed screening results, with the cutoff values listed, to be used for medical treatment purposes  only.  Unconfirmed results must not be used for non-medical purposes such as employment or legal testing.  Clinical consideration should be applied to any drug of abuse test, particularly when unconfirmed results are used.     CBC WITH AUTO DIFFERENTIAL - Abnormal; Notable for the following components:    MCV 99.3 (*)     RDW-SD 54.7 (*)     Neutrophil % 82.8 (*)     Lymphocyte % 13.2 (*)     Monocyte % 3.0 (*)     Eosinophil % 0.0 (*)     Immature Grans % 0.8 (*)     All other components within normal limits   HIGH SENSITIVITIY TROPONIN T 1HR - Abnormal; Notable for the following components:    HS Troponin T 16 (*)     Troponin T Numeric Delta 3 (*)     All other components within normal limits    Narrative:     High Sensitive Troponin T Reference Range:  <14.0 ng/L- Negative Female for AMI  <22.0 ng/L- Negative Male for AMI  >=14 - Abnormal Female indicating possible myocardial injury.  >=22 - Abnormal Male indicating possible myocardial injury.   Clinicians would have to utilize clinical acumen, EKG, Troponin, and serial changes to determine if it is an Acute Myocardial Infarction or myocardial injury due to an underlying chronic condition.        BLOOD GAS, ARTERIAL W/CO-OXIMETRY - Abnormal; Notable for the following components:    pCO2, Arterial 52.3 (*)     pO2, Arterial 73.4 (*)     HCO3, Arterial 33.4 (*)     Base Excess, Arterial 7.1 (*)     Oxyhemoglobin 93.0 (*)     CO2 Content 35.0 (*)     pCO2, Temperature Corrected 52.3 (*)     pO2, Temperature Corrected 73.4 (*)     All other components within normal limits   BNP (IN-HOUSE) - Abnormal; Notable for the following components:    proBNP 1,327.0 (*)     All other components within normal limits    Narrative:     This assay is used as an aid in the diagnosis of individuals suspected of having heart failure. It can be used as an aid in the diagnosis of acute decompensated heart failure (ADHF) in patients presenting with signs and symptoms of ADHF to the  emergency department (ED). In addition, NT-proBNP of <300 pg/mL indicates ADHF is not likely.    Age Range Result Interpretation  NT-proBNP Concentration (pg/mL:      <50             Positive            >450                   Gray                 300-450                    Negative             <300    50-75           Positive            >900                  Gray                300-900                  Negative            <300      >75             Positive            >1800                  Gray                300-1800                  Negative            <300   TROPONIN - Abnormal; Notable for the following components:    HS Troponin T 16 (*)     All other components within normal limits   RESPIRATORY PANEL PCR W/ COVID-19 (SARS-COV-2), NP SWAB IN UTM/VTP, 2 HR TAT - Normal    Narrative:     In the setting of a positive respiratory panel with a viral infection PLUS a negative procalcitonin without other underlying concern for bacterial infection, consider observing off antibiotics or discontinuation of antibiotics and continue supportive care. If the respiratory panel is positive for atypical bacterial infection (Bordetella pertussis, Chlamydophila pneumoniae, or Mycoplasma pneumoniae), consider antibiotic de-escalation to target atypical bacterial infection.   TROPONIN - Normal   MAGNESIUM - Normal   ETHANOL - Normal    Narrative:     Elevated lactic acid concentration and lactate dehydrogenase(LD) activity may falsely elevate enzymatically determined ethanol levels. Not for legal purposes.    FENTANYL, URINE - Normal    Narrative:     Negative Threshold:      Fentanyl 5 ng/mL     The normal value for the drug tested is negative. This report includes final unconfirmed screening results to be used for medical treatment purposes only. Unconfirmed results must not be used for non-medical purposes such as employment or legal testing. Clinical consideration should be applied to any drug of abuse test, particularly when  unconfirmed results are used.          BLOOD GAS, ARTERIAL   CBC AND DIFFERENTIAL    Narrative:     The following orders were created for panel order CBC & Differential.  Procedure                               Abnormality         Status                     ---------                               -----------         ------                     CBC Auto Differential[[id number]]        Abnormal            Final result                 Please view results for these tests on the individual orders.       Meds Given in ED:   Medications   ketorolac (TORADOL) injection 15 mg (15 mg Intravenous Given 5/18/25 1803)   acetaminophen (TYLENOL) tablet 1,000 mg (1,000 mg Oral Given 5/18/25 1517)   ondansetron (ZOFRAN) injection 4 mg (4 mg Intravenous Given 5/18/25 1516)   bumetanide (BUMEX) injection 1 mg (1 mg Intravenous Given 5/18/25 1640)   iopamidol (ISOVUE-370) 76 % injection 100 mL (100 mL Intravenous Given 5/18/25 1708)   bumetanide (BUMEX) injection 1 mg (1 mg Intravenous Given 5/18/25 1827)           Last NIH score:                                                          Dysphagia screening results:        Ok Coma Scale:  No data recorded     CIWA:        Restraint Type:            Isolation Status:  No active isolations

## 2025-05-18 NOTE — FSED PROVIDER NOTE
Subjective   History of Present Illness  64-year-old female with a history of hyperlipidemia GERD, IBS and CHF presents to the emergency department today with complaints of nausea and vomiting over the past 2 days.  Patient states she is having an IBS flareup.  Patient denies abdominal pain.  Patient denies any history of heart failure, states that she is not on oxygen at home and does not take medications for heart failure.  Patient does complain of a headache as well.  Patient denies any focal neurologic deficits.  Review of Systems   Constitutional: Negative.    HENT: Negative.     Eyes: Negative.    Respiratory: Negative.     Cardiovascular: Negative.    Gastrointestinal:  Positive for nausea and vomiting.   Endocrine: Negative.    Genitourinary: Negative.    Musculoskeletal: Negative.    Skin: Negative.    Allergic/Immunologic: Negative.    Neurological: Negative.    Hematological: Negative.    Psychiatric/Behavioral: Negative.         Past Medical History:   Diagnosis Date    Arthritis     Degenerative disc disease, lumbar     GERD (gastroesophageal reflux disease)        Allergies   Allergen Reactions    Codeine Headache    Penicillins Rash       Past Surgical History:   Procedure Laterality Date    CHOLECYSTECTOMY      OOPHORECTOMY         Family History   Problem Relation Age of Onset    Heart disease Mother     Heart disease Father     Diabetes Brother        Social History     Socioeconomic History    Marital status:    Tobacco Use    Smoking status: Former     Current packs/day: 0.00     Average packs/day: 1 pack/day for 10.0 years (10.0 ttl pk-yrs)     Types: Cigarettes     Start date: 1979     Quit date: 1989     Years since quittin.4     Passive exposure: Never    Smokeless tobacco: Never    Tobacco comments:     I quit 40 years ago   Vaping Use    Vaping status: Never Used   Substance and Sexual Activity    Alcohol use: Yes     Comment: no comment    Drug use: Never    Sexual  activity: Not Currently           Objective   Physical Exam  Constitutional:       Appearance: Normal appearance.   HENT:      Head: Normocephalic and atraumatic.      Right Ear: Tympanic membrane normal.      Left Ear: Tympanic membrane normal.      Nose: Nose normal.      Mouth/Throat:      Mouth: Mucous membranes are moist.   Eyes:      Extraocular Movements: Extraocular movements intact.      Pupils: Pupils are equal, round, and reactive to light.   Cardiovascular:      Rate and Rhythm: Normal rate and regular rhythm.      Pulses: Normal pulses.   Pulmonary:      Effort: Pulmonary effort is normal.      Breath sounds: Normal breath sounds.      Comments: Hypoxic  Abdominal:      General: Abdomen is flat. Bowel sounds are normal.      Palpations: Abdomen is soft.   Musculoskeletal:         General: Normal range of motion.      Cervical back: Normal range of motion.      Right lower leg: Edema present.      Left lower leg: Edema present.   Skin:     General: Skin is warm.      Capillary Refill: Capillary refill takes less than 2 seconds.   Neurological:      General: No focal deficit present.      Mental Status: She is alert.   Psychiatric:         Mood and Affect: Mood normal.         Behavior: Behavior normal.         Procedures           ED Course  ED Course as of 05/18/25 1844   Sun May 18, 2025   1647 EKG sinus rhythm ventricular rate 70 QRS 92  QTc 490 normal axis negative STEMI [JN]      ED Course User Index  [JN] Isreal Cazares MD      Patient exam conducted lab/imaging evaluated.  Patient physical exam revealed bilateral lower extremity edema and patient was initially hypoxic at 77% on room air she states she does not use oxygen at home.  Patient put on 2 L nasal cannula and eventually titrated to 3 L.  Patient found have CO2 of 53.  Patient did not appear to be in any acute respiratory distress.  Patient found to have elevated proBNP in the emergency department today.  Patient given 1 of Bumex.   Patient further found to have pericardial effusion and pleural effusion and given another gram of Bumex.  Patient initial troponin 13 with 1 hour troponin of 16 that remained stagnant at 16 again after another hour.  Patient discussed with Dr. Barron who is agreeable to transfer at this time to [facility] for further evaluation and treatment.  Patient was denying any history of CHF and seems to be noncompliant as she states she does not wear oxygen or take any diuretics at home.  Patient told nursing staff she did not believe she had heart failure although she was seen in March at  for congestive heart failure.                                     Medical Decision Making  Amount and/or Complexity of Data Reviewed  Labs: ordered.  Radiology: ordered.  ECG/medicine tests: ordered.    Risk  OTC drugs.  Prescription drug management.        Final diagnoses:   None       ED Disposition  ED Disposition       None            No follow-up provider specified.       Medication List      No changes were made to your prescriptions during this visit.

## 2025-05-19 ENCOUNTER — APPOINTMENT (OUTPATIENT)
Dept: CARDIOLOGY | Facility: HOSPITAL | Age: 65
End: 2025-05-19
Payer: MEDICARE

## 2025-05-19 PROBLEM — R06.09 DYSPNEA ON EXERTION: Status: ACTIVE | Noted: 2025-05-19

## 2025-05-19 PROBLEM — R09.02 HYPOXIA: Status: ACTIVE | Noted: 2025-05-19

## 2025-05-19 LAB
AORTIC DIMENSIONLESS INDEX: 0.84 (DI)
AV MEAN PRESS GRAD SYS DOP V1V2: 4.3 MMHG
AV VMAX SYS DOP: 142 CM/SEC
BH CV ECHO MEAS - AO MAX PG: 8.1 MMHG
BH CV ECHO MEAS - AO ROOT AREA (BSA CORRECTED): 1.6 CM2
BH CV ECHO MEAS - AO ROOT DIAM: 3.5 CM
BH CV ECHO MEAS - AO V2 VTI: 29.6 CM
BH CV ECHO MEAS - AVA(I,D): 2.6 CM2
BH CV ECHO MEAS - EDV(CUBED): 91.1 ML
BH CV ECHO MEAS - EDV(MOD-SP2): 108 ML
BH CV ECHO MEAS - EDV(MOD-SP4): 100 ML
BH CV ECHO MEAS - EF(MOD-SP2): 66.4 %
BH CV ECHO MEAS - EF(MOD-SP4): 58.2 %
BH CV ECHO MEAS - ESV(CUBED): 35.9 ML
BH CV ECHO MEAS - ESV(MOD-SP2): 36.3 ML
BH CV ECHO MEAS - ESV(MOD-SP4): 41.8 ML
BH CV ECHO MEAS - FS: 26.7 %
BH CV ECHO MEAS - IVS/LVPW: 0.79 CM
BH CV ECHO MEAS - IVSD: 0.8 CM
BH CV ECHO MEAS - LA DIMENSION: 4.2 CM
BH CV ECHO MEAS - LAT PEAK E' VEL: 8.9 CM/SEC
BH CV ECHO MEAS - LV DIASTOLIC VOL/BSA (35-75): 44.8 CM2
BH CV ECHO MEAS - LV MASS(C)D: 210.2 GRAMS
BH CV ECHO MEAS - LV MAX PG: 6.1 MMHG
BH CV ECHO MEAS - LV MEAN PG: 3 MMHG
BH CV ECHO MEAS - LV SYSTOLIC VOL/BSA (12-30): 18.7 CM2
BH CV ECHO MEAS - LV V1 MAX: 123.5 CM/SEC
BH CV ECHO MEAS - LV V1 VTI: 24.9 CM
BH CV ECHO MEAS - LVIDD: 4.5 CM
BH CV ECHO MEAS - LVIDS: 3.3 CM
BH CV ECHO MEAS - LVOT AREA: 3.1 CM2
BH CV ECHO MEAS - LVOT DIAM: 2 CM
BH CV ECHO MEAS - LVPWD: 0.9 CM
BH CV ECHO MEAS - MED PEAK E' VEL: 9.9 CM/SEC
BH CV ECHO MEAS - MV A MAX VEL: 63.9 CM/SEC
BH CV ECHO MEAS - MV DEC TIME: 0.17 SEC
BH CV ECHO MEAS - MV E MAX VEL: 71.8 CM/SEC
BH CV ECHO MEAS - MV E/A: 1.12
BH CV ECHO MEAS - SV(LVOT): 78.1 ML
BH CV ECHO MEAS - SV(MOD-SP2): 71.7 ML
BH CV ECHO MEAS - SV(MOD-SP4): 58.2 ML
BH CV ECHO MEAS - SVI(LVOT): 35 ML/M2
BH CV ECHO MEAS - SVI(MOD-SP2): 32.1 ML/M2
BH CV ECHO MEAS - SVI(MOD-SP4): 26.1 ML/M2
BH CV ECHO MEAS - TAPSE (>1.6): 2.43 CM
BH CV ECHO MEASUREMENTS AVERAGE E/E' RATIO: 7.64
BH CV XLRA - RV BASE: 3.4 CM
BH CV XLRA - RV LENGTH: 7.7 CM
BH CV XLRA - RV MID: 2.4 CM
BH CV XLRA - TDI S': 17.2 CM/SEC
CHOLEST SERPL-MCNC: 139 MG/DL (ref 0–200)
HBA1C MFR BLD: 5.6 % (ref 4.8–5.6)
HDLC SERPL-MCNC: 58 MG/DL (ref 40–60)
LDLC SERPL CALC-MCNC: 58 MG/DL (ref 0–100)
LDLC/HDLC SERPL: 0.94 {RATIO}
LEFT ATRIUM VOLUME INDEX: 24.4 ML/M2
LV EF BIPLANE MOD: 63.7 %
QT INTERVAL: 454 MS
QT INTERVAL: 468 MS
QTC INTERVAL: 490 MS
QTC INTERVAL: 490 MS
TRIGL SERPL-MCNC: 131 MG/DL (ref 0–150)
VLDLC SERPL-MCNC: 23 MG/DL (ref 5–40)

## 2025-05-19 PROCEDURE — 93306 TTE W/DOPPLER COMPLETE: CPT

## 2025-05-19 PROCEDURE — 25010000002 ENOXAPARIN PER 10 MG: Performed by: FAMILY MEDICINE

## 2025-05-19 PROCEDURE — 93010 ELECTROCARDIOGRAM REPORT: CPT | Performed by: STUDENT IN AN ORGANIZED HEALTH CARE EDUCATION/TRAINING PROGRAM

## 2025-05-19 PROCEDURE — 25010000002 BUMETANIDE PER 0.5 MG: Performed by: FAMILY MEDICINE

## 2025-05-19 PROCEDURE — 25010000002 KETOROLAC TROMETHAMINE PER 15 MG

## 2025-05-19 PROCEDURE — 99232 SBSQ HOSP IP/OBS MODERATE 35: CPT | Performed by: STUDENT IN AN ORGANIZED HEALTH CARE EDUCATION/TRAINING PROGRAM

## 2025-05-19 PROCEDURE — 80061 LIPID PANEL: CPT

## 2025-05-19 PROCEDURE — 93306 TTE W/DOPPLER COMPLETE: CPT | Performed by: INTERNAL MEDICINE

## 2025-05-19 PROCEDURE — 93005 ELECTROCARDIOGRAM TRACING: CPT | Performed by: FAMILY MEDICINE

## 2025-05-19 PROCEDURE — 97161 PT EVAL LOW COMPLEX 20 MIN: CPT

## 2025-05-19 PROCEDURE — 99222 1ST HOSP IP/OBS MODERATE 55: CPT

## 2025-05-19 PROCEDURE — 83036 HEMOGLOBIN GLYCOSYLATED A1C: CPT

## 2025-05-19 PROCEDURE — 25010000002 KETOROLAC TROMETHAMINE PER 15 MG: Performed by: NURSE PRACTITIONER

## 2025-05-19 RX ORDER — KETOROLAC TROMETHAMINE 15 MG/ML
15 INJECTION, SOLUTION INTRAMUSCULAR; INTRAVENOUS ONCE
Status: COMPLETED | OUTPATIENT
Start: 2025-05-19 | End: 2025-05-19

## 2025-05-19 RX ORDER — ACETAMINOPHEN 325 MG/1
650 TABLET ORAL EVERY 6 HOURS PRN
Status: DISCONTINUED | OUTPATIENT
Start: 2025-05-19 | End: 2025-05-19

## 2025-05-19 RX ADMIN — ENOXAPARIN SODIUM 40 MG: 100 INJECTION SUBCUTANEOUS at 05:38

## 2025-05-19 RX ADMIN — KETOROLAC TROMETHAMINE 15 MG: 15 INJECTION, SOLUTION INTRAMUSCULAR; INTRAVENOUS at 22:11

## 2025-05-19 RX ADMIN — BUMETANIDE 1 MG: 0.25 INJECTION INTRAMUSCULAR; INTRAVENOUS at 17:14

## 2025-05-19 RX ADMIN — TRAZODONE HYDROCHLORIDE 150 MG: 50 TABLET ORAL at 20:39

## 2025-05-19 RX ADMIN — KETOROLAC TROMETHAMINE 15 MG: 15 INJECTION, SOLUTION INTRAMUSCULAR; INTRAVENOUS at 14:15

## 2025-05-19 RX ADMIN — QUETIAPINE FUMARATE 300 MG: 100 TABLET ORAL at 20:39

## 2025-05-19 RX ADMIN — BUMETANIDE 1 MG: 0.25 INJECTION INTRAMUSCULAR; INTRAVENOUS at 05:38

## 2025-05-19 RX ADMIN — PANTOPRAZOLE SODIUM 40 MG: 40 TABLET, DELAYED RELEASE ORAL at 05:38

## 2025-05-19 RX ADMIN — ENOXAPARIN SODIUM 40 MG: 100 INJECTION SUBCUTANEOUS at 17:14

## 2025-05-19 RX ADMIN — Medication 10 ML: at 09:03

## 2025-05-19 RX ADMIN — Medication 10 ML: at 20:39

## 2025-05-19 RX ADMIN — ACETAMINOPHEN 650 MG: 325 TABLET ORAL at 16:27

## 2025-05-19 RX ADMIN — ROSUVASTATIN 10 MG: 10 TABLET, FILM COATED ORAL at 09:02

## 2025-05-19 NOTE — PLAN OF CARE
Goal Outcome Evaluation:  Plan of Care Reviewed With: patient        Progress: no change  Outcome Evaluation: Patient presents with weakness and decreased activity tolerance affecting functional mobility. She demonstrated symptomatic orthostatic hypotension today, with dizziness improving with time. IP PT services warranted to support return to baseline. Based on evaluation today, pt would benefit from IRF at d/c but will monitor progress closely.    Anticipated Discharge Disposition (PT): inpatient rehabilitation facility

## 2025-05-19 NOTE — H&P
Psychiatric Medicine Services  HISTORY AND PHYSICAL    Patient Name: Reshma Akhtar  : 1960  MRN: [id number]  Primary Care Physician: Natasha Mccullough DO  Date of admission: 2025      Subjective   Subjective     Chief Complaint:  Nausea, vomiting, dyspnea on exertion    HPI:  Reshma Akhtar is a 64 y.o. female with past medical history of anxiety, insomnia, morbid obesity was transferred to the facility for evaluation and management of hypoxia secondary to CHF exacerbation.  Patient states that she gets into an episode of intractable nausea and vomiting to where she cannot keep anything down for days at a time.  Patient has been having similar issues for the last few days so she went to outside facility for further evaluation.  Patient has not been seen by gastroenterology but is said to have an appointment soon.  At outside facility patient was found to be significantly hypoxic with oxygen saturation of 77% on arrival requiring oxygen supplementation via nasal cannula.  Patient does have history of CHF and admits to being noncompliant with diuretics.  Labs show that she had an elevated proBNP with elevated but flat troponin.  Her upper respiratory panel was negative.  CT of the chest was negative for any acute finding and patient denied any significant smoking history of history of COPD.  Patient was then transferred here for higher level of care.  In our facility patient's vitals were all within normal limits on 4 L nasal cannula.  Patient did admit to feeling better with treatment.  When asked she denied any recent fevers, chills, headaches, focal weakness, chest pain, palpitation,  cough, abdominal pain, vomiting, diarrhea, constipation, dysuria, hematuria, hematochezia, melena, or anxiety.      Personal History     Past Medical History:   Diagnosis Date    Arthritis     Degenerative disc disease, lumbar     GERD (gastroesophageal reflux disease)             Past Surgical History:   Procedure Laterality Date    CHOLECYSTECTOMY      OOPHORECTOMY         Family History: family history includes Diabetes in her brother; Heart disease in her father and mother.     Social History:  reports that she quit smoking about 36 years ago. Her smoking use included cigarettes. She started smoking about 46 years ago. She has a 10 pack-year smoking history. She has never been exposed to tobacco smoke. She has never used smokeless tobacco. She reports current alcohol use of about 3.0 standard drinks of alcohol per week. She reports that she does not use drugs.  Social History     Social History Narrative    Not on file       Medications:  Available home medication information reviewed.  Cyanocobalamin, DULoxetine, Diclofenac Sodium, Needle (Disp), QUEtiapine, erythromycin, nystatin-triamcinolone, omeprazole, rosuvastatin, and traZODone    Allergies   Allergen Reactions    Codeine Headache    Penicillins Rash     Review of Systems   Constitutional:  Positive for activity change and fatigue. Negative for chills and fever.   HENT:  Negative for congestion and sinus pain.    Eyes:  Negative for pain, discharge and itching.   Respiratory:  Positive for shortness of breath. Negative for apnea, cough and chest tightness.    Cardiovascular:  Positive for leg swelling. Negative for chest pain and palpitations.   Gastrointestinal:  Positive for vomiting. Negative for abdominal distention and abdominal pain.   Endocrine: Negative for cold intolerance and heat intolerance.   Genitourinary:  Negative for difficulty urinating, dysuria and flank pain.   Musculoskeletal:  Positive for arthralgias and back pain.   Neurological:  Positive for light-headedness. Negative for dizziness and facial asymmetry.   Psychiatric/Behavioral:  Negative for agitation and behavioral problems.         Objective   Objective     Vital Signs:   Temp:  [98.7 °F (37.1 °C)-99.3 °F (37.4 °C)] 98.7 °F (37.1 °C)  Heart  Rate:  [62-79] 64  Resp:  [18-19] 18  BP: (123-184)/(60-87) 144/60  Flow (L/min) (Oxygen Therapy):  [2-4] 4       Physical Exam   Constitutional: Awake, alert  Eyes: PERRLA, sclerae anicteric, no conjunctival injection  HENT: NCAT, mucous membranes moist  Neck: Supple, no thyromegaly, no lymphadenopathy, trachea midline  Respiratory: Clear to auscultation bilaterally, nonlabored respirations   Cardiovascular: RRR, no murmurs, rubs, or gallops, palpable pedal pulses bilaterally  Gastrointestinal: Positive bowel sounds, soft, nontender, nondistended  Musculoskeletal: Bilateral lower extremity edema, no clubbing or cyanosis to extremities  Psychiatric: Appropriate affect, cooperative  Neurologic: Oriented x 3, strength symmetric in all extremities, Cranial Nerves grossly intact to confrontation, speech clear  Skin: No rashes     Result Review:  I have personally reviewed the results from the time of this admission to 5/18/2025 23:10 EDT and agree with these findings:  [x]  Laboratory list / accordion  []  Microbiology  [x]  Radiology  [x]  EKG/Telemetry   []  Cardiology/Vascular   []  Pathology  []  Old records  []  Other:  Most notable findings include: Labs show that she had an elevated proBNP with elevated but flat troponin.  Her upper respiratory panel was negative.  CT of the chest was negative for any acute finding and patient denied any significant smoking history of history of COPD.       LAB RESULTS:      Lab 05/18/25  1522   WBC 6.61   HEMOGLOBIN 14.4   HEMATOCRIT 44.9   PLATELETS 259   NEUTROS ABS 5.48   IMMATURE GRANS (ABS) 0.05   LYMPHS ABS 0.87   MONOS ABS 0.20   EOS ABS 0.00   MCV 99.3*         Lab 05/18/25  1522   SODIUM 140   POTASSIUM 4.2   CHLORIDE 102   CO2 28.4   ANION GAP 9.6   BUN 9   CREATININE 0.67   EGFR 97.1   GLUCOSE 144*   CALCIUM 9.4   MAGNESIUM 1.9         Lab 05/18/25  1522   TOTAL PROTEIN 6.8   ALBUMIN 3.8   GLOBULIN 3.0   ALT (SGPT) 13   AST (SGOT) 17   BILIRUBIN 0.4   ALK PHOS 69    LIPASE 12*         Lab 05/18/25  1747 05/18/25  1644 05/18/25  1522   PROBNP  --   --  1,327.0*   HSTROP T 16* 16* 13                 Lab 05/18/25  1602   PH, ARTERIAL 7.414   PCO2, ARTERIAL 52.3*   PO2 ART 73.4*   FIO2 30   HCO3 ART 33.4*   BASE EXCESS ART 7.1*   CARBOXYHEMOGLOBIN 1.7     UA          5/18/2025    15:19   Urinalysis   Specific Gravity, UA >=1.030    Ketones, UA 40 mg/dL (2+)    Blood, UA Negative    Leukocytes, UA Negative    Nitrite, UA Negative        Microbiology Results (last 10 days)       Procedure Component Value - Date/Time    Respiratory Panel PCR w/COVID-19(SARS-CoV-2) [facility] In-House, NP Swab in UTM/VTM, 2 HR TAT - Swab, Nasopharynx [[id number]]  (Normal) Collected: 05/18/25 1519    Lab Status: Final result Specimen: Swab from Nasopharynx Updated: 05/18/25 1622     ADENOVIRUS, PCR Not Detected     Coronavirus 229E Not Detected     Coronavirus HKU1 Not Detected     Coronavirus NL63 Not Detected     Coronavirus OC43 Not Detected     COVID19 Not Detected     Human Metapneumovirus Not Detected     Human Rhinovirus/Enterovirus Not Detected     Influenza A PCR Not Detected     Influenza B PCR Not Detected     Parainfluenza Virus 1 Not Detected     Parainfluenza Virus 2 Not Detected     Parainfluenza Virus 3 Not Detected     Parainfluenza Virus 4 Not Detected     RSV, PCR Not Detected     Bordetella pertussis pcr Not Detected     Bordetella parapertussis PCR Not Detected     Chlamydophila pneumoniae PCR Not Detected     Mycoplasma pneumo by PCR Not Detected    Narrative:      In the setting of a positive respiratory panel with a viral infection PLUS a negative procalcitonin without other underlying concern for bacterial infection, consider observing off antibiotics or discontinuation of antibiotics and continue supportive care. If the respiratory panel is positive for atypical bacterial infection (Bordetella pertussis, Chlamydophila pneumoniae, or Mycoplasma pneumoniae),  consider antibiotic de-escalation to target atypical bacterial infection.            CT Angiogram Chest Pulmonary Embolism  Result Date: 5/18/2025  CT ANGIOGRAM CHEST PULMONARY EMBOLISM, CT ABDOMEN PELVIS W CONTRAST Date of Exam: 5/18/2025 4:55 PM EDT Indication: hypoxia. Comparison: None available. Technique: Axial CT images were obtained of the chest after the uneventful intravenous administration of 100 cc Isovue-300 utilizing pulmonary embolism protocol.  In addition, a 3-D volume rendered image was created for interpretation.  Reconstructed coronal and sagittal images were also obtained. Automated exposure control and iterative construction methods were used. Findings: Chest: Pulmonary arteries: Adequately opacified. No emboli demonstrated Kelsie/mediastinum: No adenopathy. Thoracic aorta normal in caliber. Mild coronary artery calcification. Trace pericardial effusion Lungs/pleura: Trace right pleural effusion. No suspicious consolidation or edema. Mild atelectasis in the lower lobes Bones/soft tissues: No acute bony abnormality Abdomen/Pelvis: Organs: Liver, spleen, pancreas, kidneys, adrenal glands unremarkable other than small bilateral renal cysts. Gallbladder surgically absent Gastrointestinal: No intestinal distention or evident wall thickening. Normal appendix Pelvis: No abnormal fluid collection. Reproductive organs within normal limits. Urinary bladder unremarkable Peritoneum/Retroperitoneum: No ascites or pneumoperitoneum. Normal caliber aorta Bones/Soft Tissues: No acute bony abnormality     Impression: 1. No evidence of pulmonary embolism 2. No acute pulmonary process. 3. Trace pericardial and right pleural effusions 4. No acute abdominopelvic process Electronically Signed: Alexey Covarrubias  5/18/2025 5:27 PM EDT  Workstation ID: [id number]    CT Abdomen Pelvis With Contrast  Result Date: 5/18/2025  CT ANGIOGRAM CHEST PULMONARY EMBOLISM, CT ABDOMEN PELVIS W CONTRAST Date of Exam: 5/18/2025 4:55 PM EDT  Indication: hypoxia. Comparison: None available. Technique: Axial CT images were obtained of the chest after the uneventful intravenous administration of 100 cc Isovue-300 utilizing pulmonary embolism protocol.  In addition, a 3-D volume rendered image was created for interpretation.  Reconstructed coronal and sagittal images were also obtained. Automated exposure control and iterative construction methods were used. Findings: Chest: Pulmonary arteries: Adequately opacified. No emboli demonstrated Kelsie/mediastinum: No adenopathy. Thoracic aorta normal in caliber. Mild coronary artery calcification. Trace pericardial effusion Lungs/pleura: Trace right pleural effusion. No suspicious consolidation or edema. Mild atelectasis in the lower lobes Bones/soft tissues: No acute bony abnormality Abdomen/Pelvis: Organs: Liver, spleen, pancreas, kidneys, adrenal glands unremarkable other than small bilateral renal cysts. Gallbladder surgically absent Gastrointestinal: No intestinal distention or evident wall thickening. Normal appendix Pelvis: No abnormal fluid collection. Reproductive organs within normal limits. Urinary bladder unremarkable Peritoneum/Retroperitoneum: No ascites or pneumoperitoneum. Normal caliber aorta Bones/Soft Tissues: No acute bony abnormality     Impression: 1. No evidence of pulmonary embolism 2. No acute pulmonary process. 3. Trace pericardial and right pleural effusions 4. No acute abdominopelvic process Electronically Signed: Alexey Covarrubias  5/18/2025 5:27 PM EDT  Workstation ID: [id number]    CT Head Without Contrast  Result Date: 5/18/2025  CT HEAD WO CONTRAST Date of Exam: 5/18/2025 4:55 PM EDT Indication: vomiting/HA. Comparison: None available. Technique: Axial CT images were obtained of the head without contrast administration.  Automated exposure control and iterative construction methods were used. Findings: Gray-white matter differentiation is maintained without evidence of an acute  infarction. No intracranial mass or mass effect. No extra-axial mass or collection. The ventricles and sulci are normal in size and configuration. The posterior fossa appears normal. Sellar and suprasellar structures are normal. Orbital and paranasal soft tissues are normal. The paranasal sinuses, ethmoid air cells, and mastoid air cells are aerated. The bony calvarium appears intact. No acute fractures. No lytic or blastic bony diseases.     Impression: Impression: No acute intracranial pathology. Electronically Signed: Bryan Camara MD  5/18/2025 5:22 PM EDT  Workstation ID: [id number]    XR Chest 1 View  Result Date: 5/18/2025  XR CHEST 1 VW Date of Exam: 5/18/2025 3:41 PM EDT Indication: hypoxia Comparison: None available. Findings: No focal consolidation. No pneumothorax or pleural effusion. Cardiomegaly. The visualized clavicles appear intact. No displaced rib fractures. The visualized upper abdomen is normal.     Impression: Impression: No acute cardiopulmonary disease. Electronically Signed: Bryan Camara MD  5/18/2025 4:03 PM EDT  Workstation ID: [id number]          Assessment & Plan   Assessment & Plan       Dyspnea on exertion    Primary insomnia    Morbid (severe) obesity due to excess calories    Mixed hyperlipidemia    CHF (congestive heart failure)    Hypoxia    Reshma Akhtar is a 64 y.o. female with past medical history of anxiety, insomnia, morbid obesity was transferred to the facility for evaluation and management of hypoxia secondary to CHF exacerbation.      Hypoxia/dyspnea on exertion  -Admit to telemetry  -Likely secondary to CHF exacerbation  -Chest CT reviewed  -BNP above baseline  -Troponin elevated but flat  -We will diurese with IV Bumex 1 mg twice daily.  Titrate as needed  -Strict input output  -1.5 L fluid restriction  -Daily weights  -Supplemental oxygen as needed  -Echocardiogram ordered and pending  -We will consult cardiology if warranted  -Supportive care    Vomiting  -  Symptoms improving seen  - Patient will likely need gastric emptying study as an outpatient    Morbid obesity  Anxiety  Insomnia    GI ppx  DVT ppx      VTE Prophylaxis:  Pharmacologic VTE prophylaxis orders are present.          CODE STATUS:    Code Status and Medical Interventions: CPR (Attempt to Resuscitate); Full Support   Ordered at: 05/18/25 224     Code Status (Patient has no pulse and is not breathing):    CPR (Attempt to Resuscitate)     Medical Interventions (Patient has pulse or is breathing):    Full Support     Level Of Support Discussed With:    Patient       Expected Discharge   Expected discharge date/ time has not been documented.     Cruz Adams MD  05/18/25

## 2025-05-19 NOTE — PLAN OF CARE
Problem: Adult Inpatient Plan of Care  Goal: Plan of Care Review  Outcome: Progressing  Flowsheets (Taken 5/19/2025 0650)  Progress: no change  Outcome Evaluation: VSS. Patient admitted from [facility]. No complaints of pain or N/V. Patient rested well during shift. No acute events during shift.  Plan of Care Reviewed With: patient  Goal: Patient-Specific Goal (Individualized)  Outcome: Progressing  Goal: Absence of Hospital-Acquired Illness or Injury  Outcome: Progressing  Intervention: Identify and Manage Fall Risk  Recent Flowsheet Documentation  Taken 5/19/2025 0600 by Felicia Murillo RN  Safety Promotion/Fall Prevention:   assistive device/personal items within reach   clutter free environment maintained   nonskid shoes/slippers when out of bed   room organization consistent   safety round/check completed  Taken 5/19/2025 0410 by Felicia Murillo RN  Safety Promotion/Fall Prevention:   assistive device/personal items within reach   clutter free environment maintained   nonskid shoes/slippers when out of bed   room organization consistent   safety round/check completed  Taken 5/19/2025 0200 by Felicia Murillo RN  Safety Promotion/Fall Prevention:   assistive device/personal items within reach   clutter free environment maintained   nonskid shoes/slippers when out of bed   room organization consistent   safety round/check completed  Taken 5/19/2025 0010 by Felicia Murillo RN  Safety Promotion/Fall Prevention:   assistive device/personal items within reach   clutter free environment maintained   nonskid shoes/slippers when out of bed   room organization consistent   safety round/check completed  Taken 5/18/2025 2234 by Felicia Murillo RN  Safety Promotion/Fall Prevention:   assistive device/personal items within reach   clutter free environment maintained   nonskid shoes/slippers when out of bed   room organization consistent   safety round/check completed  Intervention: Prevent Skin  Injury  Recent Flowsheet Documentation  Taken 5/19/2025 0600 by Felicia Murillo RN  Body Position: position changed independently  Skin Protection:   incontinence pads utilized   transparent dressing maintained  Taken 5/19/2025 0410 by Felicia Murillo RN  Body Position: position changed independently  Skin Protection:   incontinence pads utilized   transparent dressing maintained  Taken 5/19/2025 0200 by Felicia Murillo RN  Body Position: position changed independently  Taken 5/19/2025 0010 by Felicia Murillo RN  Body Position: position changed independently  Skin Protection:   incontinence pads utilized   transparent dressing maintained  Taken 5/18/2025 2234 by Felicia Murillo RN  Body Position: position changed independently  Skin Protection:   incontinence pads utilized   transparent dressing maintained  Intervention: Prevent and Manage VTE (Venous Thromboembolism) Risk  Recent Flowsheet Documentation  Taken 5/18/2025 2234 by Felicia Murillo RN  VTE Prevention/Management:   bilateral   SCDs (sequential compression devices) off  Intervention: Prevent Infection  Recent Flowsheet Documentation  Taken 5/19/2025 0600 by Felicia Murillo RN  Infection Prevention:   environmental surveillance performed   hand hygiene promoted   rest/sleep promoted  Taken 5/19/2025 0410 by Felicia Murillo RN  Infection Prevention:   environmental surveillance performed   hand hygiene promoted   rest/sleep promoted  Taken 5/19/2025 0200 by Felicia Murillo RN  Infection Prevention:   environmental surveillance performed   hand hygiene promoted   rest/sleep promoted  Taken 5/19/2025 0010 by Felicia Murilol RN  Infection Prevention:   environmental surveillance performed   hand hygiene promoted   rest/sleep promoted  Taken 5/18/2025 2234 by Felicia Murillo RN  Infection Prevention:   environmental surveillance performed   hand hygiene promoted   rest/sleep promoted  Goal: Optimal Comfort  and Wellbeing  Outcome: Progressing  Intervention: Provide Person-Centered Care  Recent Flowsheet Documentation  Taken 5/18/2025 2234 by Felicia Murillo RN  Trust Relationship/Rapport:   care explained   choices provided   emotional support provided   empathic listening provided   questions answered   questions encouraged   reassurance provided   thoughts/feelings acknowledged  Goal: Readiness for Transition of Care  Outcome: Progressing  Intervention: Mutually Develop Transition Plan  Recent Flowsheet Documentation  Taken 5/18/2025 2225 by Felicia Murillo RN  Transportation Anticipated: public transportation  Patient/Family Anticipated Services at Transition: none  Patient/Family Anticipates Transition to: home with family  Taken 5/18/2025 2221 by Felicia Murillo RN  Equipment Currently Used at Home:   none   walker, rolling     Problem: Comorbidity Management  Goal: Maintenance of Behavioral Health Symptom Control  Outcome: Progressing  Intervention: Maintain Behavioral Health Symptom Control  Recent Flowsheet Documentation  Taken 5/18/2025 2234 by Felicia Murillo RN  Medication Review/Management: medications reviewed  Goal: Maintenance of Heart Failure Symptom Control  Outcome: Progressing  Intervention: Maintain Heart Failure Management  Recent Flowsheet Documentation  Taken 5/18/2025 2234 by Felicia Murillo RN  Medication Review/Management: medications reviewed   Goal Outcome Evaluation:  Plan of Care Reviewed With: patient        Progress: no change  Outcome Evaluation: VSS. Patient admitted from [facility]. No complaints of pain or N/V. Patient rested well during shift. No acute events during shift.

## 2025-05-19 NOTE — PLAN OF CARE
Goal Outcome Evaluation:   Pt medicated for headache with tylenol. Reports back pain improved with Toradol. Denies nausea, vomiting. Tolerating PO intake well. Continues to be on 3 LPM supplemental oxygen through NC with oxygen saturation >92%. NSR on tele monitor. Pt resting in bed. Denies any needs at this time.

## 2025-05-19 NOTE — THERAPY EVALUATION
Patient Name: Reshma Akhtar  : 1960    MRN: [id number]                              Today's Date: 2025       Admit Date: 2025    Visit Dx:     ICD-10-CM ICD-9-CM   1. Acute on chronic congestive heart failure, unspecified heart failure type  I50.9 428.0   2. Acute hypoxic respiratory failure  J96.01 518.81     Patient Active Problem List   Diagnosis    Primary insomnia    Wellness examination    Morbid (severe) obesity due to excess calories    Mixed hyperlipidemia    Calcification of abdominal aorta    Psychophysiological insomnia    CHF (congestive heart failure)    Hypoxia    Dyspnea on exertion     Past Medical History:   Diagnosis Date    Arthritis     Degenerative disc disease, lumbar     GERD (gastroesophageal reflux disease)      Past Surgical History:   Procedure Laterality Date    CHOLECYSTECTOMY      OOPHORECTOMY        General Information       Row Name 25 1405          Physical Therapy Time and Intention    Document Type evaluation  -NS     Mode of Treatment physical therapy  -NS       Row Name 25 1402          General Information    Patient Profile Reviewed yes  -NS     Prior Level of Function independent:;all household mobility;community mobility;gait;transfer;bed mobility;ADL's  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  -NS     Existing Precautions/Restrictions fall;oxygen therapy device and L/min;orthostatic hypotension  -NS     Barriers to Rehab medically complex  -NS       Row Name 25 1403          Living Environment    Current Living Arrangements home  -NS     People in Home parent(s)  -NS       Row Name 25 1405          Home Main Entrance    Number of Stairs, Main Entrance none  -NS       Row Name 25 1405          Stairs Within Home, Primary    Number of Stairs, Within Home, Primary none  -NS     Stairs Comment, Within Home, Primary pt stays on 1 level of her home  -NS       Row Name 25 1404          Cognition    Orientation  Status (Cognition) oriented x 3  -NS       Row Name 05/19/25 1405          Safety Issues/Impairments Affecting Functional Mobility    Safety Issues Affecting Function (Mobility) safety precaution awareness;safety precautions follow-through/compliance  -NS     Impairments Affecting Function (Mobility) balance;endurance/activity tolerance;strength;shortness of breath;pain  -NS               User Key  (r) = Recorded By, (t) = Taken By, (c) = Cosigned By      Initials Name Provider Type    Pretty Mascorro PT Physical Therapist                   Mobility       Row Name 05/19/25 1405          Bed Mobility    Bed Mobility supine-sit;sit-supine  -NS     Supine-Sit [place] (Bed Mobility) minimum assist (75% patient effort);verbal cues  -NS     Sit-Supine [place] (Bed Mobility) standby assist  -NS     Assistive Device (Bed Mobility) bed rails;head of bed elevated  -NS     Comment, (Bed Mobility) assist at trunk, dizziness reported EOB that improved with time  -NS       Row Name 05/19/25 1405          Transfers    Comment, (Transfers) dizziness in standing that improved quickly, pt declined transferring to the chair  -NS       [facility] Name 05/19/25 1405          Sit-Stand Transfer    Sit-Stand [place] (Transfers) contact guard  -NS     Assistive Device (Sit-Stand Transfers) other (see comments)  HHA  -NS       [facility] Name 05/19/25 1405          Gait/Stairs (Locomotion)    [place] Level (Gait) not tested  -NS     Comment, (Gait/Stairs) Pt declined OOB activity due to fatigue and feeling overwhelmed by recent news.  -NS               User Key  (r) = Recorded By, (t) = Taken By, (c) = Cosigned By      Initials Name Provider Type    Pretty Mascorro PT Physical Therapist                   Obj/Interventions       Row Name 05/19/25 1405          Range of Motion Comprehensive    General Range of Motion bilateral lower extremity ROM WFL  -NS       [facility] Name 05/19/25 1405          Strength Comprehensive (MMT)    General  Manual Muscle Testing (MMT) Assessment lower extremity strength deficits identified  -NS     Comment, General Manual Muscle Testing (MMT) Assessment BLEs: grossly 4+/5  -NS       [facility] Name 05/19/25 1405          Balance    Balance Assessment sitting static balance;sitting dynamic balance;standing static balance  -NS     Static Sitting Balance standby assist  -NS     Dynamic Sitting Balance standby assist  -NS     Position, Sitting Balance unsupported;sitting edge of bed  -NS     Static Standing Balance contact guard  -NS     Position/Device Used, Standing Balance unsupported  -NS       Row Name 05/19/25 1405          Sensory Assessment (Somatosensory)    Sensory Assessment (Somatosensory) LE sensation intact  -NS               User Key  (r) = Recorded By, (t) = Taken By, (c) = Cosigned By      Initials Name Provider Type    Pretty Mascorro, PT Physical Therapist                   Goals/Plan       Row Name 05/19/25 1405          Bed Mobility Goal 1 (PT)    Activity/Assistive Device (Bed Mobility Goal 1, PT) supine to sit  -NS     [place] Level/Cues Needed (Bed Mobility Goal 1, PT) independent  -NS     Time Frame (Bed Mobility Goal 1, PT) 5 days;short term goal (STG)  -NS       Row Name 05/19/25 1405          Transfer Goal 1 (PT)    Activity/Assistive Device (Transfer Goal 1, PT) sit-to-stand/stand-to-sit;bed-to-chair/chair-to-bed  -NS     [place] Level/Cues Needed (Transfer Goal 1, PT) independent  -NS     Time Frame (Transfer Goal 1, PT) long term goal (LTG);10 days  -NS       Row Name 05/19/25 1405          Gait Training Goal 1 (PT)    Activity/Assistive Device (Gait Training Goal 1, PT) gait (walking locomotion)  -NS     [place] Level (Gait Training Goal 1, PT) standby assist  -NS     Distance (Gait Training Goal 1, PT) 200  -NS     Time Frame (Gait Training Goal 1, PT) long term goal (LTG);10 days  -NS       Row Name 05/19/25 1405          Therapy Assessment/Plan (PT)    Planned Therapy  Interventions (PT) balance training;bed mobility training;gait training;home exercise program;neuromuscular re-education;transfer training;strengthening;patient/family education  -NS               User Key  (r) = Recorded By, (t) = Taken By, (c) = Cosigned By      Initials Name Provider Type    Pretty Mascorro, PT Physical Therapist                   Clinical Impression       Row Name 05/19/25 1400          Pain    Pretreatment Pain Rating 5/10  -NS     Posttreatment Pain Rating 0/10 - no pain  -NS     Pain Location back  -NS     Pain Side/Orientation generalized  -NS     Pain Management Interventions premedicated for activity;nursing notified  -NS     Response to Pain Interventions activity participation with decreased pain  -NS       Row Name 05/19/25 1401          Plan of Care Review    Plan of Care Reviewed With patient  -NS     Progress no change  -NS     Outcome Evaluation Patient presents with weakness and decreased activity tolerance affecting functional mobility. She demonstrated symptomatic orthostatic hypotension today, with dizziness improving with time. IP PT services warranted to support return to baseline. Based on evaluation today, pt would benefit from IRF at d/c but will monitor progress closely.  -NS       Row Name 05/19/25 9397          Therapy Assessment/Plan (PT)    Patient/Family Therapy Goals Statement (PT) to get less dizzy  -NS     Rehab Potential (PT) good  -NS     Criteria for Skilled Interventions Met (PT) yes;meets criteria;skilled treatment is necessary  -NS     Therapy Frequency (PT) daily  -NS     Predicted Duration of Therapy Intervention (PT) 10 days  -NS       Row Name 05/19/25 1409          Vital Signs    Pre Systolic BP Rehab 175  -NS     Pre Treatment Diastolic BP 97  -NS     Intra Systolic BP Rehab 131  -NS     Intra Treatment Diastolic BP 66  -NS     Post Systolic BP Rehab 115  -NS     Post Treatment Diastolic BP 65  -NS     Pretreatment Heart Rate (beats/min) 71  -NS      Posttreatment Heart Rate (beats/min) 77  -NS     Pre SpO2 (%) 90  -NS     O2 Delivery Pre Treatment nasal cannula  -NS     Post SpO2 (%) 90  -NS     O2 Delivery Post Treatment nasal cannula  -NS     Pre Patient Position Supine  -NS     Intra Patient Position Standing  -NS     Post Patient Position Supine  -NS       Row Name 05/19/25 1405          Positioning and Restraints    Pre-Treatment Position in bed  -NS     Post Treatment Position bed  -NS     In Bed notified nsg;supine;call light within reach;encouraged to call for assist;exit alarm on;side rails up x2  -NS               User Key  (r) = Recorded By, (t) = Taken By, (c) = Cosigned By      Initials Name Provider Type    Pretty Mascorro, PT Physical Therapist                   Outcome Measures       Row Name 05/19/25 1405 05/19/25 1021       How much help from another person do you currently need...    Turning from your back to your side while in flat bed without using bedrails? 4  -NS 4  -NP    Moving from lying on back to sitting on the side of a flat bed without bedrails? 3  -NS 4  -NP    Moving to and from a bed to a chair (including a wheelchair)? 3  -NS 4  -NP    Standing up from a chair using your arms (e.g., wheelchair, bedside chair)? 3  -NS 4  -NP    Climbing 3-5 steps with a railing? 2  -NS 3  -NP    To walk in hospital room? 3  -NS 4  -NP    AM-PAC 6 Clicks Score (PT) 18  -NS 23  -NP    Highest Level of Mobility Goal Walk 10 Steps or More-6  -NS Walk 25 Feet or More-7  -NP      Row Name 05/19/25 0850 05/19/25 0410       How much help from another person do you currently need...    Turning from your back to your side while in flat bed without using bedrails? --  -NP 4  -EDVIN    Moving from lying on back to sitting on the side of a flat bed without bedrails? --  -NP 4  -DEVIN    Moving to and from a bed to a chair (including a wheelchair)? --  -NP 4  -DEVIN    Standing up from a chair using your arms (e.g., wheelchair, bedside chair)? --  -NP 4  -DEVIN     Climbing 3-5 steps with a railing? --  -NP 3  -DEVIN    To walk in hospital room? --  -NP 4  -DEVIN    AM-PAC 6 Clicks Score (PT) --  -NP 23  -DEVIN    Highest Level of Mobility Goal --  -NP Walk 25 Feet or More-7  -DEVIN      Row Name 05/19/25 1405          Functional Assessment    Outcome Measure Options AM-PAC 6 Clicks Basic Mobility (PT)  -NS               User Key  (r) = Recorded By, (t) = Taken By, (c) = Cosigned By      Initials Name Provider Type    Pretty Mascorro, PT Physical Therapist    Felicia Pena RN Registered Nurse    Amalia Kelly RN Registered Nurse                                 Physical Therapy Education       Title: PT OT SLP Therapies (In Progress)       Topic: Physical Therapy (In Progress)       Point: Mobility training (Done)       Learning Progress Summary            Patient Acceptance, E, VU,NR by NS at 5/19/2025 1553    Comment: benefits of OOB activity                      Point: Home exercise program (Not Started)       Learner Progress:  Not documented in this visit.              Point: Body mechanics (Done)       Learning Progress Summary            Patient Acceptance, E, VU,NR by NS at 5/19/2025 1553    Comment: benefits of OOB activity                      Point: Precautions (Done)       Learning Progress Summary            Patient Acceptance, E, VU,NR by NS at 5/19/2025 1553    Comment: benefits of OOB activity                                      User Key       Initials Effective Dates Name Provider Type Kendal MENDOZA 06/16/21 -  Pretty Mccullough, PT Physical Therapist PT                  PT Recommendation and Plan  Planned Therapy Interventions (PT): balance training, bed mobility training, gait training, home exercise program, neuromuscular re-education, transfer training, strengthening, patient/family education  Progress: no change  Outcome Evaluation: Patient presents with weakness and decreased activity tolerance affecting functional mobility. She demonstrated symptomatic  orthostatic hypotension today, with dizziness improving with time. IP PT services warranted to support return to baseline. Based on evaluation today, pt would benefit from IRF at d/c but will monitor progress closely.     Time Calculation:   PT Evaluation Complexity  History, PT Evaluation Complexity: 3 or more personal factors and/or comorbidities  Examination of Body Systems (PT Eval Complexity): total of 4 or more elements  Clinical Presentation (PT Evaluation Complexity): stable  Clinical Decision Making (PT Evaluation Complexity): low complexity  Overall Complexity (PT Evaluation Complexity): low complexity     PT Charges       Row Name 05/19/25 1405             Time Calculation    Start Time 1405  -NS      PT Received On 05/19/25  -NS      PT Goal Re-Cert Due Date 05/29/25  -NS         Untimed Charges    PT Eval/Re-eval Minutes 47  -NS         Total Minutes    Untimed Charges Total Minutes 47  -NS       Total Minutes 47  -NS                User Key  (r) = Recorded By, (t) = Taken By, (c) = Cosigned By      Initials Name Provider Type    NS Pretty Mccullough, PT Physical Therapist                  Therapy Charges for Today       Code Description Service Date Service Provider Modifiers Qty    [id number]  PT EVAL LOW COMPLEXITY 4 5/19/2025 Pretty Mccullough, PT GP 1            PT G-Codes  Outcome Measure Options: AM-PAC 6 Clicks Basic Mobility (PT)  AM-PAC 6 Clicks Score (PT): 18  PT Discharge Summary  Anticipated Discharge Disposition (PT): inpatient rehabilitation facility    Pretty Mccullough PT  5/19/2025

## 2025-05-19 NOTE — PROGRESS NOTES
[facility] Medicine Services  PROGRESS NOTE    Patient Name: Reshma Akhtar  : 1960  MRN: [id number]    Date of Admission: 2025  Primary Care Physician: Natasha Mccullough DO    Subjective   Subjective     CC:  Nausea, Vomiting, hypoxia    HPI:  Patient seen resting comfortably in bed in no acute distress.  Patient reports a history of IBS that is isolated to nausea and vomiting.  Patient denies any diarrhea.  Patient states she has noted to have progressive weight gain over the past year, stating secondary to sedentary lifestyle.  Denies any lower extremity edema.  Patient states she is supposed to follow-up with GI for intermittent nausea and vomiting, however has not been able to make an appointment.  Currently, patient is asymptomatic denying any nausea, vomiting, or diarrhea.  Reports improvement in respiratory status.  Denies any sick contacts or abnormal food intake.      Objective   Objective     Vital Signs:   Temp:  [97.9 °F (36.6 °C)-99.3 °F (37.4 °C)] 98.2 °F (36.8 °C)  Heart Rate:  [60-71] 69  Resp:  [16-19] 18  BP: (103-184)/(60-97) 159/85  Flow (L/min) (Oxygen Therapy):  [3-4] 3     Physical Exam  Constitutional:       General: She is not in acute distress.  Cardiovascular:      Rate and Rhythm: Normal rate.      Pulses: Normal pulses.   Pulmonary:      Effort: Pulmonary effort is normal. No respiratory distress.   Abdominal:      General: There is no distension.      Palpations: Abdomen is soft.      Tenderness: There is no abdominal tenderness. There is no guarding or rebound.   Musculoskeletal:      Right lower leg: No edema.      Left lower leg: No edema.   Skin:     General: Skin is warm.   Neurological:      Mental Status: She is alert.   Psychiatric:         Mood and Affect: Mood normal.         Behavior: Behavior normal.           Results Reviewed:  LAB RESULTS:      Lab 25  1747 25  1644 25  1522   WBC  --   --  6.61   HEMOGLOBIN   --   --  14.4   HEMATOCRIT  --   --  44.9   PLATELETS  --   --  259   NEUTROS ABS  --   --  5.48   IMMATURE GRANS (ABS)  --   --  0.05   LYMPHS ABS  --   --  0.87   MONOS ABS  --   --  0.20   EOS ABS  --   --  0.00   MCV  --   --  99.3*   HSTROP T 16* 16* 13         Lab 05/19/25  1108 05/18/25  1522   SODIUM  --  140   POTASSIUM  --  4.2   CHLORIDE  --  102   CO2  --  28.4   ANION GAP  --  9.6   BUN  --  9   CREATININE  --  0.67   EGFR  --  97.1   GLUCOSE  --  144*   CALCIUM  --  9.4   MAGNESIUM  --  1.9   HEMOGLOBIN A1C 5.60  --          Lab 05/18/25  1522   TOTAL PROTEIN 6.8   ALBUMIN 3.8   GLOBULIN 3.0   ALT (SGPT) 13   AST (SGOT) 17   BILIRUBIN 0.4   ALK PHOS 69   LIPASE 12*         Lab 05/18/25  1747 05/18/25  1644 05/18/25  1522   PROBNP  --   --  1,327.0*   HSTROP T 16* 16* 13         Lab 05/19/25  1108   CHOLESTEROL 139   LDL CHOL 58   HDL CHOL 58   TRIGLYCERIDES 131             Lab 05/18/25  1602   PH, ARTERIAL 7.414   PCO2, ARTERIAL 52.3*   PO2 ART 73.4*   FIO2 30   HCO3 ART 33.4*   BASE EXCESS ART 7.1*   CARBOXYHEMOGLOBIN 1.7     Brief Urine Lab Results  (Last result in the past 365 days)        Color   Clarity   Blood   Leuk Est   Nitrite   Protein   CREAT   Urine HCG        05/18/25 1519 Yellow   Clear   Negative   Negative   Negative   Trace                   Microbiology Results Abnormal       None            CT Angiogram Chest Pulmonary Embolism  Result Date: 5/18/2025  CT ANGIOGRAM CHEST PULMONARY EMBOLISM, CT ABDOMEN PELVIS W CONTRAST Date of Exam: 5/18/2025 4:55 PM EDT Indication: hypoxia. Comparison: None available. Technique: Axial CT images were obtained of the chest after the uneventful intravenous administration of 100 cc Isovue-300 utilizing pulmonary embolism protocol.  In addition, a 3-D volume rendered image was created for interpretation.  Reconstructed coronal and sagittal images were also obtained. Automated exposure control and iterative construction methods were used. Findings:  Chest: Pulmonary arteries: Adequately opacified. No emboli demonstrated Kelsie/mediastinum: No adenopathy. Thoracic aorta normal in caliber. Mild coronary artery calcification. Trace pericardial effusion Lungs/pleura: Trace right pleural effusion. No suspicious consolidation or edema. Mild atelectasis in the lower lobes Bones/soft tissues: No acute bony abnormality Abdomen/Pelvis: Organs: Liver, spleen, pancreas, kidneys, adrenal glands unremarkable other than small bilateral renal cysts. Gallbladder surgically absent Gastrointestinal: No intestinal distention or evident wall thickening. Normal appendix Pelvis: No abnormal fluid collection. Reproductive organs within normal limits. Urinary bladder unremarkable Peritoneum/Retroperitoneum: No ascites or pneumoperitoneum. Normal caliber aorta Bones/Soft Tissues: No acute bony abnormality     Impression: 1. No evidence of pulmonary embolism 2. No acute pulmonary process. 3. Trace pericardial and right pleural effusions 4. No acute abdominopelvic process Electronically Signed: Alexey Covarrubias  5/18/2025 5:27 PM EDT  Workstation ID: [id number]    CT Abdomen Pelvis With Contrast  Result Date: 5/18/2025  CT ANGIOGRAM CHEST PULMONARY EMBOLISM, CT ABDOMEN PELVIS W CONTRAST Date of Exam: 5/18/2025 4:55 PM EDT Indication: hypoxia. Comparison: None available. Technique: Axial CT images were obtained of the chest after the uneventful intravenous administration of 100 cc Isovue-300 utilizing pulmonary embolism protocol.  In addition, a 3-D volume rendered image was created for interpretation.  Reconstructed coronal and sagittal images were also obtained. Automated exposure control and iterative construction methods were used. Findings: Chest: Pulmonary arteries: Adequately opacified. No emboli demonstrated Kelsie/mediastinum: No adenopathy. Thoracic aorta normal in caliber. Mild coronary artery calcification. Trace pericardial effusion Lungs/pleura: Trace right pleural effusion. No  suspicious consolidation or edema. Mild atelectasis in the lower lobes Bones/soft tissues: No acute bony abnormality Abdomen/Pelvis: Organs: Liver, spleen, pancreas, kidneys, adrenal glands unremarkable other than small bilateral renal cysts. Gallbladder surgically absent Gastrointestinal: No intestinal distention or evident wall thickening. Normal appendix Pelvis: No abnormal fluid collection. Reproductive organs within normal limits. Urinary bladder unremarkable Peritoneum/Retroperitoneum: No ascites or pneumoperitoneum. Normal caliber aorta Bones/Soft Tissues: No acute bony abnormality     Impression: 1. No evidence of pulmonary embolism 2. No acute pulmonary process. 3. Trace pericardial and right pleural effusions 4. No acute abdominopelvic process Electronically Signed: Alexey Maynes  5/18/2025 5:27 PM EDT  Workstation ID: [id number]    CT Head Without Contrast  Result Date: 5/18/2025  CT HEAD WO CONTRAST Date of Exam: 5/18/2025 4:55 PM EDT Indication: vomiting/HA. Comparison: None available. Technique: Axial CT images were obtained of the head without contrast administration.  Automated exposure control and iterative construction methods were used. Findings: Gray-white matter differentiation is maintained without evidence of an acute infarction. No intracranial mass or mass effect. No extra-axial mass or collection. The ventricles and sulci are normal in size and configuration. The posterior fossa appears normal. Sellar and suprasellar structures are normal. Orbital and paranasal soft tissues are normal. The paranasal sinuses, ethmoid air cells, and mastoid air cells are aerated. The bony calvarium appears intact. No acute fractures. No lytic or blastic bony diseases.     Impression: Impression: No acute intracranial pathology. Electronically Signed: Bryan Camara MD  5/18/2025 5:22 PM EDT  Workstation ID: [id number]    XR Chest 1 View  Result Date: 5/18/2025  XR CHEST 1 VW Date of Exam: 5/18/2025 3:41 PM EDT  Indication: hypoxia Comparison: None available. Findings: No focal consolidation. No pneumothorax or pleural effusion. Cardiomegaly. The visualized clavicles appear intact. No displaced rib fractures. The visualized upper abdomen is normal.     Impression: Impression: No acute cardiopulmonary disease. Electronically Signed: Bryan Camara MD  5/18/2025 4:03 PM EDT  Workstation ID: [id number]      Results for orders placed during the hospital encounter of 05/18/25    Adult Transthoracic Echo Complete W/ Cont if Necessary Per Protocol    Interpretation Summary    Left ventricular systolic function is normal. Calculated left ventricular EF = 63.7% Left ventricular ejection fraction appears to be 61 - 65%.    Left ventricular diastolic function was normal.    Normal cardiac valves.      Current medications:  Scheduled Meds:bumetanide, 1 mg, Intravenous, Q12H  enoxaparin sodium, 40 mg, Subcutaneous, Q12H  pantoprazole, 40 mg, Oral, Q AM  pharmacy consult - MTM, , Not Applicable, Daily  QUEtiapine, 300 mg, Oral, Nightly  rosuvastatin, 10 mg, Oral, Daily  sodium chloride, 10 mL, Intravenous, Q12H  traZODone, 150 mg, Oral, Nightly      Continuous Infusions:   PRN Meds:.  acetaminophen    senna-docusate sodium **AND** polyethylene glycol **AND** bisacodyl **AND** bisacodyl    ipratropium-albuterol    ketorolac    ondansetron    sodium chloride    sodium chloride    Assessment & Plan   Assessment & Plan     Active Hospital Problems    Diagnosis  POA    **Dyspnea on exertion [R06.09]  Unknown    Hypoxia [R09.02]  Unknown    CHF (congestive heart failure) [I50.9]  Yes    Mixed hyperlipidemia [E78.2]  Yes    Morbid (severe) obesity due to excess calories [E66.01]  Yes    Primary insomnia [F51.01]  Yes      Resolved Hospital Problems   No resolved problems to display.        Brief Hospital Course to date:  Reshma Akhtar is a 64 y.o. female with a past medical history of anxiety, hyperlipidemia, IBS, insomnia, morbid obesity  was transferred to [facility] for evaluation and management of hypoxia secondary to CHF exacerbation.  Cardiology consulted upon admission, currently receiving IV diuresis.     HFpEF exacerbation  - Patient presenting with nausea, vomiting, and hypoxia  - CTA chest negative for pulmonary edema, trace pericardial and right pleural effusion  - proBNP elevated at 1300  - Repeat echo with EF of 61 to 65%, normal diastolic function, normal cardiac valves  PLAN  - Cardiology consulted continuing IV diuresis.  Plan to discharge with 14-day Holter monitor.  Will need sleep study at discharge.  - Cardiology to set up CT calcium score outpatient  - Strict I's and O's, daily weights  - PT/OT = inpatient rehab     Reported history of IBS  -CT abdomen pelvis and CT head unremarkable  -Patient reports intermittent nausea and vomiting secondary to reported diagnosis of IBS.  Patient reports she needs gastroenterology referral at discharge. No previous EGD/colonoscopy per chart review. Denies any history of constipation/diarrhea  -Currently asymptomatic without any nausea or vomiting  -Plan for GI referral discharge    Hyperlipidemia  -Continue statin    GERD  -Continue PPI     Anxiety  Insomnia  -Continue trazodone, seroquel      Expected Discharge Location and Transportation: Inpatient rehab recs  Expected Discharge   Expected discharge date/ time has not been documented.     VTE Prophylaxis:  Pharmacologic VTE prophylaxis orders are present.         AM-PAC 6 Clicks Score (PT): 18 (05/19/25 8230)    CODE STATUS:   Code Status and Medical Interventions: CPR (Attempt to Resuscitate); Full Support   Ordered at: 05/18/25 6217     Code Status (Patient has no pulse and is not breathing):    CPR (Attempt to Resuscitate)     Medical Interventions (Patient has pulse or is breathing):    Full Support     Level Of Support Discussed With:    Patient       Evgeny Cass,   05/19/25

## 2025-05-19 NOTE — CASE MANAGEMENT/SOCIAL WORK
Discharge Planning Assessment  [facility]     Patient Name: Reshma Akhtar  MRN: [id number]  Today's Date: 5/19/2025    Admit Date: 5/18/2025    Plan: Home   Discharge Needs Assessment       Row Name 05/19/25 0942       Living Environment    People in Home parent(s)    Current Living Arrangements home    Primary Care Provided by self    Provides Primary Care For parent(s)    Caregiving Concerns Provides care for mother (Susan Padron), who has dementia    Family Caregiver if Needed none       Transition Planning    Patient/Family Anticipates Transition to home with family    Patient/Family Anticipated Services at Transition        Discharge Needs Assessment    Equipment Currently Used at Home shower chair;pulse ox    Concerns to be Addressed denies needs/concerns at this time    Discharge Coordination/Progress Lives in a house in [place] [place] with her mother, independent with ADLs. Ms Akhtar lives on one level, but there are 7 stairs to enter. She has a shower chair and pulse oximeter at home.  No history of home health and no advanced directives.                   Discharge Plan       Row Name 05/19/25 0945       Plan    Plan Home    Patient/Family in Agreement with Plan yes    Plan Comments I spoke with Ms Akhtar at bedside.  Mr Akhtar resides at the address listed on her face sheet, in a two story house in [place].  There are 7 stairs to enter the house, but she is all on one level once inside.  She resides with her mother Susan, and provides care for Susan.  Ms Akhtar is independent with ADLs.  She has a shower chair and pulse ox at home.  She has not had home health in the past, and does not have any advanced directives.  Her insurance is Medicare (A only) and [place] Medicaid.  She does have prescription coverage.  Her PCP is Natasha Mccullough, and she gets her prescriptions filled at [facility] off of [address]. At this time she denies any discharge needs.  She is  currently on oxygen and has none at home.  Ms Akhtar will also need help with transportation ([employer]) at discharge and will be returning to her home address.    Final Discharge Disposition Code 01 - home or self-care                       Demographic Summary    No documentation.                  Functional Status       Row Name 05/19/25 0941       Functional Status    Usual Activity Tolerance good    Current Activity Tolerance good       Functional Status, IADL    Medications independent    Meal Preparation independent    Housekeeping independent    Laundry independent    Shopping independent       Mental Status    General Appearance WDL WDL                   Psychosocial    No documentation.                  Abuse/Neglect    No documentation.                  Legal    No documentation.                  Substance Abuse    No documentation.                  Patient Forms    No documentation.                     Huma Beard, RN

## 2025-05-20 LAB
ANION GAP SERPL CALCULATED.3IONS-SCNC: 13 MMOL/L (ref 5–15)
BUN SERPL-MCNC: 12 MG/DL (ref 8–23)
BUN/CREAT SERPL: 16.2 (ref 7–25)
CALCIUM SPEC-SCNC: 8.8 MG/DL (ref 8.6–10.5)
CHLORIDE SERPL-SCNC: 97 MMOL/L (ref 98–107)
CO2 SERPL-SCNC: 32 MMOL/L (ref 22–29)
CREAT SERPL-MCNC: 0.74 MG/DL (ref 0.57–1)
DEPRECATED RDW RBC AUTO: 53.7 FL (ref 37–54)
EGFRCR SERPLBLD CKD-EPI 2021: 89.9 ML/MIN/1.73
ERYTHROCYTE [DISTWIDTH] IN BLOOD BY AUTOMATED COUNT: 15.1 % (ref 12.3–15.4)
FERRITIN SERPL-MCNC: 83.18 NG/ML (ref 13–150)
GLUCOSE SERPL-MCNC: 89 MG/DL (ref 65–99)
HCT VFR BLD AUTO: 43.9 % (ref 34–46.6)
HGB BLD-MCNC: 14.2 G/DL (ref 12–15.9)
IRON 24H UR-MRATE: 48 MCG/DL (ref 37–145)
IRON SATN MFR SERPL: 11 % (ref 20–50)
MAGNESIUM SERPL-MCNC: 1.5 MG/DL (ref 1.6–2.4)
MAGNESIUM SERPL-MCNC: 2.4 MG/DL (ref 1.6–2.4)
MCH RBC QN AUTO: 32.2 PG (ref 26.6–33)
MCHC RBC AUTO-ENTMCNC: 32.3 G/DL (ref 31.5–35.7)
MCV RBC AUTO: 99.5 FL (ref 79–97)
PHOSPHATE SERPL-MCNC: 4.6 MG/DL (ref 2.5–4.5)
PLATELET # BLD AUTO: 240 10*3/MM3 (ref 140–450)
PMV BLD AUTO: 8.7 FL (ref 6–12)
POTASSIUM SERPL-SCNC: 3.2 MMOL/L (ref 3.5–5.2)
POTASSIUM SERPL-SCNC: 3.5 MMOL/L (ref 3.5–5.2)
RBC # BLD AUTO: 4.41 10*6/MM3 (ref 3.77–5.28)
SODIUM SERPL-SCNC: 142 MMOL/L (ref 136–145)
TIBC SERPL-MCNC: 451 MCG/DL (ref 298–536)
TRANSFERRIN SERPL-MCNC: 303 MG/DL (ref 200–360)
WBC NRBC COR # BLD AUTO: 4.24 10*3/MM3 (ref 3.4–10.8)

## 2025-05-20 PROCEDURE — 83735 ASSAY OF MAGNESIUM: CPT

## 2025-05-20 PROCEDURE — 84132 ASSAY OF SERUM POTASSIUM: CPT

## 2025-05-20 PROCEDURE — 85027 COMPLETE CBC AUTOMATED: CPT | Performed by: FAMILY MEDICINE

## 2025-05-20 PROCEDURE — 83540 ASSAY OF IRON: CPT

## 2025-05-20 PROCEDURE — 99232 SBSQ HOSP IP/OBS MODERATE 35: CPT | Performed by: STUDENT IN AN ORGANIZED HEALTH CARE EDUCATION/TRAINING PROGRAM

## 2025-05-20 PROCEDURE — 25010000002 ENOXAPARIN PER 10 MG: Performed by: FAMILY MEDICINE

## 2025-05-20 PROCEDURE — 82728 ASSAY OF FERRITIN: CPT | Performed by: STUDENT IN AN ORGANIZED HEALTH CARE EDUCATION/TRAINING PROGRAM

## 2025-05-20 PROCEDURE — 83735 ASSAY OF MAGNESIUM: CPT | Performed by: STUDENT IN AN ORGANIZED HEALTH CARE EDUCATION/TRAINING PROGRAM

## 2025-05-20 PROCEDURE — 84100 ASSAY OF PHOSPHORUS: CPT | Performed by: STUDENT IN AN ORGANIZED HEALTH CARE EDUCATION/TRAINING PROGRAM

## 2025-05-20 PROCEDURE — 25010000002 MAGNESIUM SULFATE 2 GM/50ML SOLUTION

## 2025-05-20 PROCEDURE — 84466 ASSAY OF TRANSFERRIN: CPT

## 2025-05-20 PROCEDURE — 97165 OT EVAL LOW COMPLEX 30 MIN: CPT

## 2025-05-20 PROCEDURE — 25010000002 BUMETANIDE PER 0.5 MG: Performed by: FAMILY MEDICINE

## 2025-05-20 PROCEDURE — 99232 SBSQ HOSP IP/OBS MODERATE 35: CPT

## 2025-05-20 PROCEDURE — 80048 BASIC METABOLIC PNL TOTAL CA: CPT | Performed by: FAMILY MEDICINE

## 2025-05-20 PROCEDURE — 25010000002 KETOROLAC TROMETHAMINE PER 15 MG: Performed by: STUDENT IN AN ORGANIZED HEALTH CARE EDUCATION/TRAINING PROGRAM

## 2025-05-20 RX ORDER — KETOROLAC TROMETHAMINE 15 MG/ML
15 INJECTION, SOLUTION INTRAMUSCULAR; INTRAVENOUS EVERY 6 HOURS PRN
Status: DISPENSED | OUTPATIENT
Start: 2025-05-20 | End: 2025-05-22

## 2025-05-20 RX ORDER — PANTOPRAZOLE SODIUM 40 MG/1
40 TABLET, DELAYED RELEASE ORAL ONCE
Status: COMPLETED | OUTPATIENT
Start: 2025-05-20 | End: 2025-05-20

## 2025-05-20 RX ORDER — POTASSIUM CHLORIDE 750 MG/1
40 CAPSULE, EXTENDED RELEASE ORAL ONCE
Status: DISCONTINUED | OUTPATIENT
Start: 2025-05-20 | End: 2025-05-20

## 2025-05-20 RX ORDER — TRAZODONE HYDROCHLORIDE 100 MG/1
300 TABLET ORAL NIGHTLY
Status: DISCONTINUED | OUTPATIENT
Start: 2025-05-20 | End: 2025-05-22 | Stop reason: HOSPADM

## 2025-05-20 RX ORDER — POTASSIUM CHLORIDE 1500 MG/1
40 TABLET, EXTENDED RELEASE ORAL EVERY 4 HOURS
Status: COMPLETED | OUTPATIENT
Start: 2025-05-20 | End: 2025-05-20

## 2025-05-20 RX ORDER — MAGNESIUM SULFATE HEPTAHYDRATE 40 MG/ML
2 INJECTION, SOLUTION INTRAVENOUS ONCE
Status: COMPLETED | OUTPATIENT
Start: 2025-05-20 | End: 2025-05-20

## 2025-05-20 RX ORDER — POTASSIUM CHLORIDE 750 MG/1
40 CAPSULE, EXTENDED RELEASE ORAL ONCE
Status: COMPLETED | OUTPATIENT
Start: 2025-05-20 | End: 2025-05-20

## 2025-05-20 RX ADMIN — ENOXAPARIN SODIUM 40 MG: 100 INJECTION SUBCUTANEOUS at 18:14

## 2025-05-20 RX ADMIN — MAGNESIUM SULFATE HEPTAHYDRATE 2 G: 40 INJECTION, SOLUTION INTRAVENOUS at 09:54

## 2025-05-20 RX ADMIN — Medication 10 ML: at 21:11

## 2025-05-20 RX ADMIN — POTASSIUM CHLORIDE 40 MEQ: 1500 TABLET, EXTENDED RELEASE ORAL at 18:14

## 2025-05-20 RX ADMIN — ROSUVASTATIN 10 MG: 10 TABLET, FILM COATED ORAL at 08:48

## 2025-05-20 RX ADMIN — ENOXAPARIN SODIUM 40 MG: 100 INJECTION SUBCUTANEOUS at 06:41

## 2025-05-20 RX ADMIN — POLYETHYLENE GLYCOL 3350 17 G: 17 POWDER, FOR SOLUTION ORAL at 09:54

## 2025-05-20 RX ADMIN — Medication 10 ML: at 09:55

## 2025-05-20 RX ADMIN — POTASSIUM CHLORIDE 40 MEQ: 1500 TABLET, EXTENDED RELEASE ORAL at 15:00

## 2025-05-20 RX ADMIN — PANTOPRAZOLE SODIUM 40 MG: 40 TABLET, DELAYED RELEASE ORAL at 19:06

## 2025-05-20 RX ADMIN — PANTOPRAZOLE SODIUM 40 MG: 40 TABLET, DELAYED RELEASE ORAL at 06:40

## 2025-05-20 RX ADMIN — TRAZODONE HYDROCHLORIDE 300 MG: 100 TABLET ORAL at 21:11

## 2025-05-20 RX ADMIN — KETOROLAC TROMETHAMINE 15 MG: 15 INJECTION, SOLUTION INTRAMUSCULAR; INTRAVENOUS at 15:00

## 2025-05-20 RX ADMIN — ACETAMINOPHEN, ASPIRIN, CAFFEINE 1 TABLET: 250; 65; 250 TABLET, FILM COATED ORAL at 08:49

## 2025-05-20 RX ADMIN — POTASSIUM CHLORIDE 40 MEQ: 750 CAPSULE, EXTENDED RELEASE ORAL at 09:54

## 2025-05-20 RX ADMIN — BUMETANIDE 1 MG: 0.25 INJECTION INTRAMUSCULAR; INTRAVENOUS at 06:40

## 2025-05-20 RX ADMIN — QUETIAPINE FUMARATE 300 MG: 100 TABLET ORAL at 21:11

## 2025-05-20 NOTE — CASE MANAGEMENT/SOCIAL WORK
Continued Stay Note  [facility] [place] [facility]     Patient Name: Reshma Akhtar  MRN: [id number]  Today's Date: 5/20/2025    Admit Date: 5/18/2025    Plan: Home with [facility]   Discharge Plan       Row Name 05/20/25 1541       Plan    Plan Home with [facility]    Patient/Family in Agreement with Plan yes    Plan Comments PT has worked with Ms Akhtar and is recommending inpatient rehab.  OT has worked with her and recommended home.  I spoke with Ms Akhtar at bedside.  At this time she is not open to inpatient rehab or skilled nursing, but is interested in home health.  I have made a referral to [facility] for skilled nursing and physical therapy, and they have accepted her.  Ms Akhtar also stated that she would need oxygen prior to returning home, and would not be able to afford it out of pocket.  I have called and made a referral to [facility], however [employer] is showing that her [place] Medicaid insurance is inactive.  At this time Ms Akhtar is contacting her insurance on the back of her card to question this. Case management will continue to follow.    Final Discharge Disposition Code 06 - home with home health care                   Discharge Codes    No documentation.                       Huma Beard RN

## 2025-05-20 NOTE — PROGRESS NOTES
[facility] Medicine Services  PROGRESS NOTE    Patient Name: Reshma Akhtar  : 1960  MRN: [id number]    Date of Admission: 2025  Primary Care Physician: Natasha Mccullough DO    Subjective   Subjective     CC:  Nausea, Vomiting, hypoxia    HPI:  Patient seen resting comfortably in bedside chair in NAD.  Denies any respiratory distress currently. Reports chronic headache, requesting neurology and ophthalmology referral.       Objective   Objective     Vital Signs:   Temp:  [97.8 °F (36.6 °C)-98.6 °F (37 °C)] 98 °F (36.7 °C)  Heart Rate:  [55-74] 74  Resp:  [14-18] 18  BP: (103-159)/(59-85) 145/66  Flow (L/min) (Oxygen Therapy):  [2-3] 3     Physical Exam  Constitutional:       General: She is not in acute distress.  Cardiovascular:      Rate and Rhythm: Normal rate.      Pulses: Normal pulses.   Pulmonary:      Effort: Pulmonary effort is normal. No respiratory distress.   Abdominal:      General: There is no distension.      Palpations: Abdomen is soft.      Tenderness: There is no abdominal tenderness. There is no guarding or rebound.   Musculoskeletal:      Right lower leg: No edema.      Left lower leg: No edema.   Skin:     General: Skin is warm.   Neurological:      Mental Status: She is alert.   Psychiatric:         Mood and Affect: Mood normal.         Behavior: Behavior normal.           Results Reviewed:  LAB RESULTS:      Lab 25  0427 25  1747 25  1644 25  1522   WBC 4.24  --   --  6.61   HEMOGLOBIN 14.2  --   --  14.4   HEMATOCRIT 43.9  --   --  44.9   PLATELETS 240  --   --  259   NEUTROS ABS  --   --   --  5.48   IMMATURE GRANS (ABS)  --   --   --  0.05   LYMPHS ABS  --   --   --  0.87   MONOS ABS  --   --   --  0.20   EOS ABS  --   --   --  0.00   MCV 99.5*  --   --  99.3*   HSTROP T  --  16* 16* 13         Lab 25  1219 25  0427 25  1108 25  1522   SODIUM  --  142  --  140   POTASSIUM 3.5 3.2*  --  4.2    CHLORIDE  --  97*  --  102   CO2  --  32.0*  --  28.4   ANION GAP  --  13.0  --  9.6   BUN  --  12  --  9   CREATININE  --  0.74  --  0.67   EGFR  --  89.9  --  97.1   GLUCOSE  --  89  --  144*   CALCIUM  --  8.8  --  9.4   MAGNESIUM 2.4 1.5*  --  1.9   PHOSPHORUS  --  4.6*  --   --    HEMOGLOBIN A1C  --   --  5.60  --          Lab 05/18/25  1522   TOTAL PROTEIN 6.8   ALBUMIN 3.8   GLOBULIN 3.0   ALT (SGPT) 13   AST (SGOT) 17   BILIRUBIN 0.4   ALK PHOS 69   LIPASE 12*         Lab 05/18/25  1747 05/18/25  1644 05/18/25  1522   PROBNP  --   --  1,327.0*   HSTROP T 16* 16* 13         Lab 05/19/25  1108   CHOLESTEROL 139   LDL CHOL 58   HDL CHOL 58   TRIGLYCERIDES 131         Lab 05/20/25  1219   IRON 48   IRON SATURATION (TSAT) 11*   TIBC 451   TRANSFERRIN 303         Lab 05/18/25  1602   PH, ARTERIAL 7.414   PCO2, ARTERIAL 52.3*   PO2 ART 73.4*   FIO2 30   HCO3 ART 33.4*   BASE EXCESS ART 7.1*   CARBOXYHEMOGLOBIN 1.7     Brief Urine Lab Results  (Last result in the past 365 days)        Color   Clarity   Blood   Leuk Est   Nitrite   Protein   CREAT   Urine HCG        05/18/25 1519 Yellow   Clear   Negative   Negative   Negative   Trace                   Microbiology Results Abnormal       None            CT Angiogram Chest Pulmonary Embolism  Result Date: 5/18/2025  CT ANGIOGRAM CHEST PULMONARY EMBOLISM, CT ABDOMEN PELVIS W CONTRAST Date of Exam: 5/18/2025 4:55 PM EDT Indication: hypoxia. Comparison: None available. Technique: Axial CT images were obtained of the chest after the uneventful intravenous administration of 100 cc Isovue-300 utilizing pulmonary embolism protocol.  In addition, a 3-D volume rendered image was created for interpretation.  Reconstructed coronal and sagittal images were also obtained. Automated exposure control and iterative construction methods were used. Findings: Chest: Pulmonary arteries: Adequately opacified. No emboli demonstrated Kelsie/mediastinum: No adenopathy. Thoracic aorta normal  in caliber. Mild coronary artery calcification. Trace pericardial effusion Lungs/pleura: Trace right pleural effusion. No suspicious consolidation or edema. Mild atelectasis in the lower lobes Bones/soft tissues: No acute bony abnormality Abdomen/Pelvis: Organs: Liver, spleen, pancreas, kidneys, adrenal glands unremarkable other than small bilateral renal cysts. Gallbladder surgically absent Gastrointestinal: No intestinal distention or evident wall thickening. Normal appendix Pelvis: No abnormal fluid collection. Reproductive organs within normal limits. Urinary bladder unremarkable Peritoneum/Retroperitoneum: No ascites or pneumoperitoneum. Normal caliber aorta Bones/Soft Tissues: No acute bony abnormality     Impression: 1. No evidence of pulmonary embolism 2. No acute pulmonary process. 3. Trace pericardial and right pleural effusions 4. No acute abdominopelvic process Electronically Signed: Alexey Covarrubias  5/18/2025 5:27 PM EDT  Workstation ID: [id number]    CT Abdomen Pelvis With Contrast  Result Date: 5/18/2025  CT ANGIOGRAM CHEST PULMONARY EMBOLISM, CT ABDOMEN PELVIS W CONTRAST Date of Exam: 5/18/2025 4:55 PM EDT Indication: hypoxia. Comparison: None available. Technique: Axial CT images were obtained of the chest after the uneventful intravenous administration of 100 cc Isovue-300 utilizing pulmonary embolism protocol.  In addition, a 3-D volume rendered image was created for interpretation.  Reconstructed coronal and sagittal images were also obtained. Automated exposure control and iterative construction methods were used. Findings: Chest: Pulmonary arteries: Adequately opacified. No emboli demonstrated Kelsie/mediastinum: No adenopathy. Thoracic aorta normal in caliber. Mild coronary artery calcification. Trace pericardial effusion Lungs/pleura: Trace right pleural effusion. No suspicious consolidation or edema. Mild atelectasis in the lower lobes Bones/soft tissues: No acute bony abnormality Abdomen/Pelvis:  Organs: Liver, spleen, pancreas, kidneys, adrenal glands unremarkable other than small bilateral renal cysts. Gallbladder surgically absent Gastrointestinal: No intestinal distention or evident wall thickening. Normal appendix Pelvis: No abnormal fluid collection. Reproductive organs within normal limits. Urinary bladder unremarkable Peritoneum/Retroperitoneum: No ascites or pneumoperitoneum. Normal caliber aorta Bones/Soft Tissues: No acute bony abnormality     Impression: 1. No evidence of pulmonary embolism 2. No acute pulmonary process. 3. Trace pericardial and right pleural effusions 4. No acute abdominopelvic process Electronically Signed: Alexey Maynes  5/18/2025 5:27 PM EDT  Workstation ID: [id number]    CT Head Without Contrast  Result Date: 5/18/2025  CT HEAD WO CONTRAST Date of Exam: 5/18/2025 4:55 PM EDT Indication: vomiting/HA. Comparison: None available. Technique: Axial CT images were obtained of the head without contrast administration.  Automated exposure control and iterative construction methods were used. Findings: Gray-white matter differentiation is maintained without evidence of an acute infarction. No intracranial mass or mass effect. No extra-axial mass or collection. The ventricles and sulci are normal in size and configuration. The posterior fossa appears normal. Sellar and suprasellar structures are normal. Orbital and paranasal soft tissues are normal. The paranasal sinuses, ethmoid air cells, and mastoid air cells are aerated. The bony calvarium appears intact. No acute fractures. No lytic or blastic bony diseases.     Impression: Impression: No acute intracranial pathology. Electronically Signed: Bryan Camara MD  5/18/2025 5:22 PM EDT  Workstation ID: [id number]      Results for orders placed during the hospital encounter of 05/18/25    Adult Transthoracic Echo Complete W/ Cont if Necessary Per Protocol    Interpretation Summary    Left ventricular systolic function is normal. Calculated  left ventricular EF = 63.7% Left ventricular ejection fraction appears to be 61 - 65%.    Left ventricular diastolic function was normal.    Normal cardiac valves.      Current medications:  Scheduled Meds:[Held by provider] bumetanide, 1 mg, Intravenous, Q12H  enoxaparin sodium, 40 mg, Subcutaneous, Q12H  pantoprazole, 40 mg, Oral, Q AM  pharmacy consult - MTM, , Not Applicable, Daily  potassium chloride ER, 40 mEq, Oral, Q4H  QUEtiapine, 300 mg, Oral, Nightly  rosuvastatin, 10 mg, Oral, Daily  sodium chloride, 10 mL, Intravenous, Q12H  traZODone, 300 mg, Oral, Nightly      Continuous Infusions:   PRN Meds:.  aspirin-acetaminophen-caffeine    senna-docusate sodium **AND** polyethylene glycol **AND** bisacodyl **AND** bisacodyl    Calcium Replacement - Follow Nurse / BPA Driven Protocol    ipratropium-albuterol    ketorolac    Magnesium Cardiology Dose Replacement - Follow Nurse / BPA Driven Protocol    ondansetron    Phosphorus Replacement - Follow Nurse / BPA Driven Protocol    Potassium Replacement - Follow Nurse / BPA Driven Protocol    sodium chloride    sodium chloride    Assessment & Plan   Assessment & Plan     Active Hospital Problems    Diagnosis  POA    **Dyspnea on exertion [R06.09]  Unknown    Hypoxia [R09.02]  Unknown    CHF (congestive heart failure) [I50.9]  Yes    Mixed hyperlipidemia [E78.2]  Yes    Morbid (severe) obesity due to excess calories [E66.01]  Yes    Primary insomnia [F51.01]  Yes      Resolved Hospital Problems   No resolved problems to display.        Brief Hospital Course to date:  Reshma Akhtar is a 64 y.o. female with a past medical history of anxiety, hyperlipidemia, IBS, insomnia, morbid obesity was transferred to [facility] for evaluation and management of hypoxia secondary to CHF exacerbation.  Cardiology consulted upon admission, currently receiving IV diuresis.     HFpEF exacerbation  - Patient presenting with nausea, vomiting, and hypoxia  - CTA chest  negative for pulmonary edema, trace pericardial and right pleural effusion  - proBNP elevated at 1300  - Repeat echo with EF of 61 to 65%, normal diastolic function, normal cardiac valves  - Per chart review, patient requiring 1 to 3 L nasal cannula since at least 2022. Patient reports previous need for supplemental oxygen  PLAN  - Cardiology consulted, continuing IV diuresis while inpatient.  Plan to discharge with 14-day Holter monitor.  Will need sleep study at discharge. No plan for diuretics upon discharge in setting of orthostatic hypotension. Will need sleep medicine evaluation for RANJIT. Follow up with cardiology in 3 months.   - Cardiology to set up CT calcium score outpatient  - Strict I's and O's, daily weights  - PT/OT = inpatient rehab   - Wean oxygen for goal oxygen saturation greater than 90%  - May benefit from outpatient pulmonology follow up    Reported history of IBS  -CT abdomen pelvis and CT head unremarkable  -Patient reports intermittent nausea and vomiting secondary to reported diagnosis of IBS.  Patient reports she needs gastroenterology referral at discharge. No previous EGD/colonoscopy per chart review. Denies any history of constipation/diarrhea  -Currently asymptomatic without any nausea or vomiting  -Plan for GI referral at discharge    Hyperlipidemia  -Continue statin    GERD  -Continue PPI     Anxiety  Insomnia  -Continue trazodone, seroquel    Headache, chronic  - Takes Tylenol and ibuprofen at home with mild relief  - CT head unremarkable  - Requesting neurology follow-up outpatient.  Will place referral upon discharge.    Left eye soreness  - Patient requesting ophthalmology referral discharge      Expected Discharge Location and Transportation: Inpatient rehab recs  Expected Discharge   Expected discharge date/ time has not been documented.     VTE Prophylaxis:  Pharmacologic VTE prophylaxis orders are present.         AM-PAC 6 Clicks Score (PT): 19 (05/19/25 9912)    CODE STATUS:    Code Status and Medical Interventions: CPR (Attempt to Resuscitate); Full Support   Ordered at: 05/18/25 8216     Code Status (Patient has no pulse and is not breathing):    CPR (Attempt to Resuscitate)     Medical Interventions (Patient has pulse or is breathing):    Full Support     Level Of Support Discussed With:    Patient       Evgeny Odell DO  05/20/25

## 2025-05-20 NOTE — PLAN OF CARE
Goal Outcome Evaluation:              Outcome Evaluation: Pt still c/o intermittant dizziness.  3L Humidified nasal cannula.  NSR on monitor.  Vascular consult today.  Torodol given for headache - one time dose.  Pt rested well all night.  Continue POC.

## 2025-05-20 NOTE — PROGRESS NOTES
"[facility] Progress Note     LOS: 1 day   Patient Care Team:  Natasha Mccullough DO as PCP - General (Family Medicine)  Salbador Esquivel III, MD as Cardiologist (Cardiology)  Natasha Mccullough DO as Referring Physician (Family Medicine)  Viky Cavanaugh APRN as  (Psychiatry)  PCP:  Natasha Mccullough DO    Chief Complaint:  CHF    Subjective: Patient sitting in recliner eating breakfast this morning with complaints of intense dizziness upon standing this morning and generalized fatigue.  Patient states that she took her oxygen off this morning to use the restroom and did not put it back on for which her oxygen dropped down into the mid 80s.  Patient is still requiring 3 L nasal cannula to maintain oxygen saturation of 90-92%.  Patient states that she felt intense dizziness upon standing to move from a lying to a sitting to standing position today and was noted to have positive orthostatic blood pressure changes yesterday.  Patient did not feel comfortable going home just yet and wanted to stay another night.  Patient endorses a fair amount of stress in her home life and fears this may be contributing to her overall decline in health.  Patient also states that she is not taken great care of herself in the last several years and states she has been a \" couch potato\".       OBJECTIVE  REVIEW OF SYSTEMS:  @Cardiovascular ROS: positive for - palpitations and shortness of breath@        Vital Sign Min/Max for last 24 hours  Temp  Min: 97.8 °F (36.6 °C)  Max: 98.6 °F (37 °C)   BP  Min: 103/72  Max: 175/97   Pulse  Min: 55  Max: 74   Resp  Min: 14  Max: 18   SpO2  Min: 84 %  Max: 97 %   No data recorded   Weight  Min: 117 kg (257 lb)  Max: 117 kg (257 lb)     Telemetry: NSR      I&O/ Weights:     Intake/Output Summary (Last 24 hours) at 5/20/2025 1414  Last data filed at 5/19/2025 1732  Gross per 24 hour   Intake 240 ml   Output --   Net 240 ml         05/19/25  0538 " "05/20/25  0502   Weight: 121 kg (266 lb 8.6 oz) 117 kg (257 lb)     Flowsheet Rows      Flowsheet Row First Filed Value   Admission Height 165.1 cm (65\") Documented at 05/18/2025 1436   Admission Weight 118 kg (260 lb) Documented at 05/18/2025 1436           Physical Exam:  Vitals reviewed.   Constitutional:       Appearance: Overweight and not in distress.      Interventions: Nasal cannula in place.      Comments: 3L NC   Neck:      Vascular: No JVR. JVD normal.   Pulmonary:      Effort: Pulmonary effort is normal.      Breath sounds: Normal breath sounds.   Cardiovascular:      Normal rate. Regular rhythm.      Murmurs: There is no murmur.   Pulses:     Intact distal pulses.   Edema:     Peripheral edema present.     Thigh: bilateral trace edema of the thigh.     Pretibial: bilateral trace edema of the pretibial area.     Ankle: bilateral 1+ edema of the ankle.     Feet: bilateral 1+ edema of the feet.  Musculoskeletal: Normal range of motion. Skin:     General: Skin is warm and dry.   Neurological:      General: No focal deficit present.      Mental Status: Alert.   Psychiatric:         Behavior: Behavior is cooperative.         Labs:   Results from last 7 days   Lab Units 05/20/25  0427 05/18/25  1522   WBC 10*3/mm3 4.24 6.61   HEMOGLOBIN g/dL 14.2 14.4   HEMATOCRIT % 43.9 44.9   PLATELETS 10*3/mm3 240 259     Lab Results   Lab Value Date/Time    TROPONINT 16 (H) 05/18/2025 1747    TROPONINT 16 (H) 05/18/2025 1644    TROPONINT 13 05/18/2025 1522         Results from last 7 days   Lab Units 05/20/25  1219 05/20/25  0427 05/18/25  1522   SODIUM mmol/L  --  142 140   POTASSIUM mmol/L 3.5 3.2* 4.2   CHLORIDE mmol/L  --  97* 102   CO2 mmol/L  --  32.0* 28.4   BUN mg/dL  --  12 9   CREATININE mg/dL  --  0.74 0.67   CALCIUM mg/dL  --  8.8 9.4   BILIRUBIN mg/dL  --   --  0.4   ALK PHOS U/L  --   --  69   ALT (SGPT) U/L  --   --  13   AST (SGOT) U/L  --   --  17   GLUCOSE mg/dL  --  89 144*     Results from last 7 days "   Lab Units 05/19/25  1108   HEMOGLOBIN A1C % 5.60     Results from last 7 days   Lab Units 05/19/25  1108   CHOLESTEROL mg/dL 139   TRIGLYCERIDES mg/dL 131   HDL CHOL mg/dL 58   LDL CHOL mg/dL 58                   Medication Review:   bumetanide, 1 mg, Intravenous, Q12H  enoxaparin sodium, 40 mg, Subcutaneous, Q12H  pantoprazole, 40 mg, Oral, Q AM  pharmacy consult - MTM, , Not Applicable, Daily  potassium chloride ER, 40 mEq, Oral, Q4H  QUEtiapine, 300 mg, Oral, Nightly  rosuvastatin, 10 mg, Oral, Daily  sodium chloride, 10 mL, Intravenous, Q12H  traZODone, 300 mg, Oral, Nightly             IMAGING/DIAGNOSTICS     Results for orders placed during the hospital encounter of 05/18/25    Adult Transthoracic Echo Complete W/ Cont if Necessary Per Protocol    Interpretation Summary    Left ventricular systolic function is normal. Calculated left ventricular EF = 63.7% Left ventricular ejection fraction appears to be 61 - 65%.    Left ventricular diastolic function was normal.    Normal cardiac valves.      Problem List:     Dyspnea on exertion    Primary insomnia    Morbid (severe) obesity due to excess calories    Mixed hyperlipidemia    CHF (congestive heart failure)    Hypoxia         Assessment:  CHF with Dyspnea   CAD  EF 61-65%  Troponin 13 -->16 --> 16, Delta 3,  proBNP 1,327  Bumex 1mg x2 --> Bumex 1mg BID ideally until on RA   Calcification of abdominal aorta discovered via XR spine 2022, never had apt. for CT Calcium score set up --> will schedule for her at discharge.   HLD  Well controlled lipid panel   Crestor 10 mg   HTN, goal < 140/80  Orthostatic Hypotension   Normotensive  Appears to historically have low normal BP's   Documented Orthostatic BP's with symptomatic dizziness   Hypoxia  SPo2 77% on arrival to ED  Small bilateral pleural effusion noted on arrival, receiving diuretics with no change in oxygen requirements.   Normal H&H  Normal EF with mildly elevated proBNP, pending CT calcium score   No hx  of COPD/ ILD   Negative for PE  Concern for RANJIT, will send referral at discharge   Will check iron, TIBC, Vitamin B-12 and Haptoglobin as other possible sources   May need Pulmonology consult   Headahces with Blurred Vision in Left Eye  Complaints of headaches in frontal and parietal area of the head with associated blurriness in left eye.  CT head negative for mass or stroke.  No carotid bruits heard on examination  May need neurology outpatient consult   GERD  IBS  PPI  Frequent N/V, Frequent hospitalizations for the above.  GI consult placed for discharge   DMII  A1C 5.6 today   SSI per attending team    Plan:   Can continue to diuresis with 1mg Bumex daily while inpatient for remainder of mild edema in BLE. Would not discharge home with diuretic with concern for dehydration secondary to frequent N/V and worsening of orthostatic hypotension.  Concern for hypoxia still after diuresis, patient still requiring 3L NC to maintain 90-92%. Will likely need to be sent with home oxygen and need pulmonary consult. Will order additional iron/anemia blood tests for the a.m.   Will need 14 day Holter monitor at discharge for complaints of palpitations.  Will order sleep medicine referral for concern of sleep apnea/ hypoxia   Will order outpatient CT Cardiac Calcium Score for follow up of atherosclerotic changes seen on x-ray along with complaints of atypical midsternal chest pain.  Patient is able to be discharged home from a cardiology perspective with outpatient follow-up within 3 months of discharge, however patient requested to stay 1 more night.  Dr. Esquivel to resume care tomorrow.       DENIS Justice

## 2025-05-20 NOTE — THERAPY EVALUATION
Patient Name: Reshma Akhtar  : 1960    MRN: [id number]                              Today's Date: 2025       Admit Date: 2025    Visit Dx:     ICD-10-CM ICD-9-CM   1. Acute on chronic congestive heart failure, unspecified heart failure type  I50.9 428.0   2. Acute hypoxic respiratory failure  J96.01 518.81     Patient Active Problem List   Diagnosis    Primary insomnia    Wellness examination    Morbid (severe) obesity due to excess calories    Mixed hyperlipidemia    Calcification of abdominal aorta    Psychophysiological insomnia    CHF (congestive heart failure)    Hypoxia    Dyspnea on exertion     Past Medical History:   Diagnosis Date    Arthritis     Degenerative disc disease, lumbar     GERD (gastroesophageal reflux disease)      Past Surgical History:   Procedure Laterality Date    CHOLECYSTECTOMY      OOPHORECTOMY        General Information       Row Name 25 0813          OT Time and Intention    Subjective Information no complaints  -EWA     Document Type evaluation  -EWA     Mode of Treatment individual therapy;occupational therapy  -EWA     Patient Effort adequate  -EWA     Symptoms Noted During/After Treatment significant change in vital signs  -EWA     Comment pt. with new 02 NC use 2L, pt. with activity drops to upper 80's 02 saturation, grossly 30 seconds to one minute recovery with PLB  -EWA       Row Name 25 0813          General Information    Patient Profile Reviewed yes  -EWA     Prior Level of Function independent:;all household mobility;community mobility;gait;transfer;feeding;grooming;dressing;bathing  -EWA     Existing Precautions/Restrictions fall;oxygen therapy device and L/min  -EWA     Barriers to Rehab medically complex  -EWA       Row Name 25 0813          Occupational Profile    Environmental Supports and Barriers (Occupational Profile) tub shower with shower chair and grab bars, regular toilet, pt. does not use an AD, but a rw wx and cane are  available as needed  -EWA       Row Name 05/20/25 0813          Living Environment    Current Living Arrangements home  -EWA     People in Home parent(s);other (see comments)  pt. lives with her mother, pt. cares for her mother due to she has dementia  -EWA       Row Name 05/20/25 0813          Home Main Entrance    Number of Stairs, Main Entrance none  7 steps in at second entrance, but uses not step entrance  -EWA       Row Name 05/20/25 0813          Stairs Within Home, Primary    Number of Stairs, Within Home, Primary none  -EWA       Row Name 05/20/25 0813          Cognition    Orientation Status (Cognition) oriented x 3  -EWA       Row Name 05/20/25 0813          Safety Issues/Impairments Affecting Functional Mobility    Safety Issues Affecting Function (Mobility) safety precaution awareness  pt. declined use of gait belt  -     Impairments Affecting Function (Mobility) endurance/activity tolerance;shortness of breath;strength  -               User Key  (r) = Recorded By, (t) = Taken By, (c) = Cosigned By      Initials Name Provider Type    Sagrario Jane OT Occupational Therapist                     Mobility/ADL's       Row Name 05/20/25 0815          Bed Mobility    Supine-Sit [place] (Bed Mobility) modified independence  -     Assistive Device (Bed Mobility) head of bed elevated  -       Row Name 05/20/25 0815          Transfers    Transfers sit-stand transfer;stand-sit transfer;toilet transfer  -     Comment, (Transfers) pt. refused gait belt or need for AD, pt. reported no dizziness  -EWA       Row Name 05/20/25 0815          Sit-Stand Transfer    Sit-Stand [place] (Transfers) standby assist  -EWA       Row Name 05/20/25 0815          Stand-Sit Transfer    Stand-Sit [place] (Transfers) standby assist  -EWA       Row Name 05/20/25 0815          Toilet Transfer    [place] Level (Toilet Transfer) supervision  -     Assistive Device (Toilet Transfer) commode  -       Row Name  05/20/25 0815          Functional Mobility    Functional Mobility- Ind. Level standby assist  -     Functional Mobility-Distance (Feet) 10  + grossly 56 ft  -     Functional Mobility- Safety Issues supplemental O2  -     Functional Mobility- Comment 02 saturation to 88% NC, one minute recovery with PLB  -[facility] Name 05/20/25 0815          Activities of Daily Living    BADL Assessment/Intervention lower body dressing;toileting;grooming  -[facility] Name 05/20/25 0815          Lower Body Dressing Assessment/Training    [place] Level (Lower Body Dressing) doff;don;socks;independent  -     Position (Lower Body Dressing) unsupported sitting  -       Row Name 05/20/25 0815          Toileting Assessment/Training    [place] Level (Toileting) adjust/manage clothing;perform perineal hygiene;supervision  -     Position (Toileting) unsupported sitting;unsupported standing  -[facility] Name 05/20/25 0815          Grooming Assessment/Training    Comment, (Grooming) pt. declined all except. used hand gel post donning and doffing socks  -               User Key  (r) = Recorded By, (t) = Taken By, (c) = Cosigned By      Initials Name Provider Type    Sagrario Jane, OT Occupational Therapist                   Obj/Interventions       [facility] Name 05/20/25 0818          Sensory Assessment (Somatosensory)    Sensory Assessment (Somatosensory) UE sensation intact  -[facility] Name 05/20/25 0818          Vision Assessment/Intervention    Visual Impairment/Limitations corrective lenses for reading  -[facility] Name 05/20/25 0818          Range of Motion Comprehensive    General Range of Motion bilateral upper extremity ROM WFL  -[facility] Name 05/20/25 0818          Strength Comprehensive (MMT)    General Manual Muscle Testing (MMT) Assessment upper extremity strength deficits identified  -     Comment, General Manual Muscle Testing (MMT) Assessment L shoulder flex/abd 3+/5, R shoulder 4/5  -        Row Name 05/20/25 0818          Motor Skills    Motor Skills functional endurance  -EWA     Functional Endurance impaired, new 02 use 2L NC, with activity 02 saturation into upper 80's with 30 second to one minute recovery with PLB  -EWA     Therapeutic Exercise --  OT educated on several pulmonary TE to complete throughout the day, pt. eating BF and did not demonstrate back, but verbalized understanding  -EWA       Row Name 05/20/25 0818          Balance    Balance Assessment standing dynamic balance  -EWA     Static Sitting Balance independent  -EWA     Dynamic Sitting Balance independent  -EWA     Position, Sitting Balance unsupported;sitting edge of bed;sitting in chair;other (see comments)  toilet  -EWA     Static Standing Balance standby assist  -EWA     Dynamic Standing Balance standby assist  -EWA     Position/Device Used, Standing Balance unsupported  -EWA     Balance Interventions sit to stand;occupation based/functional task  -EWA     Comment, Balance toileting, LBD  -EWA               User Key  (r) = Recorded By, (t) = Taken By, (c) = Cosigned By      Initials Name Provider Type    Sagrario Jane, OT Occupational Therapist                   Goals/Plan       Row Name 05/20/25 0824          ROM Goal 1 (OT)    ROM Goal 1 (OT) Patient will demonstrate independence completing 5 reps each pulmonary TE and ID 2 EC techniques she can use at home to support return to PLOF IADLs.  -EWA     Time Frame (ROM Goal 1, OT) short term goal (STG);4 days  -EWA     Progress/Outcome (ROM Goal 1, OT) new goal  -EWA       Row Name 05/20/25 0824          Problem Specific Goal 1 (OT)    Problem Specific Goal 1 (OT) Patient will complete 10 minutes of standing activity on 2LNC or less 02 with 02 saturation 90%+ to demonstrate improved activity tolerance.  -EWA     Time Frame (Problem Specific Goal 1, OT) long term goal (LTG);1 week  -EWA     Progress/Outcome (Problem Specific Goal 1, OT) new goal  -EWA       Row Name 05/20/25 0824           Therapy Assessment/Plan (OT)    Planned Therapy Interventions (OT) activity tolerance training;BADL retraining;patient/caregiver education/training;ROM/therapeutic exercise;occupation/activity based interventions;strengthening exercise  -               User Key  (r) = Recorded By, (t) = Taken By, (c) = Cosigned By      Initials Name Provider Type    EWA Sagrario Hermosillo, OT Occupational Therapist                   Clinical Impression       Row Name 05/20/25 0820          Pain Assessment    Pretreatment Pain Rating 0/10 - no pain  -EWA     Posttreatment Pain Rating 0/10 - no pain  -EWA       Row Name 05/20/25 0820          Plan of Care Review    Plan of Care Reviewed With patient  -EWA     Progress improving  -EWA     Outcome Evaluation Patient demonstrated impaired activity tolerance impacting PLOF ADLs warranting continued skilled OT services. Pt. demonstrated ability to complete movement OOB, to bathroom, movement in room and performance of BADL tasks without assist, but with new use of 2L NC and 02 saturation to 88% with activity and 30 second to one minute recovery with PLB.  OT services to focus on education for energy conservation and pulmonary TE/building activity tolerance to support return to PLOF. Recommend home at discharge.  -       Row Name 05/20/25 0820          Therapy Assessment/Plan (OT)    Patient/Family Therapy Goal Statement (OT) return to PLOF  -EWA     Rehab Potential (OT) good  -EWA     Criteria for Skilled Therapeutic Interventions Met (OT) yes;meets criteria;skilled treatment is necessary  -EWA     Therapy Frequency (OT) daily  -EWA     Predicted Duration of Therapy Intervention (OT) 7 days  -EWA       Row Name 05/20/25 0820          Therapy Plan Review/Discharge Plan (OT)    Anticipated Discharge Disposition (OT) home  -       Row Name 05/20/25 0820          Vital Signs    Pre Systolic BP Rehab 115  -EWA     Pre Treatment Diastolic BP 74  -EWA     Pretreatment Heart Rate (beats/min) 63  -EWA      Posttreatment Heart Rate (beats/min) 74  -EWA     Pre SpO2 (%) 93  -EWA     O2 Delivery Pre Treatment nasal cannula  -EWA     Intra SpO2 (%) 88  -EWA     O2 Delivery Intra Treatment nasal cannula  -EWA     Post SpO2 (%) 90  -EWA     O2 Delivery Post Treatment nasal cannula  -EWA     Pre Patient Position Supine  -EWA     Intra Patient Position Standing  -EWA     Post Patient Position Sitting  -EWA       Row Name 05/20/25 0820          Positioning and Restraints    Pre-Treatment Position in bed  -EWA     Post Treatment Position chair  -EWA     In Chair notified nsg;reclined;call light within reach;encouraged to call for assist;exit alarm on;waffle cushion;legs elevated  -EWA               User Key  (r) = Recorded By, (t) = Taken By, (c) = Cosigned By      Initials Name Provider Type    Sagrario Jane, CRISTO Occupational Therapist                   Outcome Measures       Row Name 05/20/25 0826          How much help from another is currently needed...    Putting on and taking off regular lower body clothing? 4  -EWA     Bathing (including washing, rinsing, and drying) 4  -EWA     Toileting (which includes using toilet bed pan or urinal) 4  -EWA     Putting on and taking off regular upper body clothing 4  -EAW     Taking care of personal grooming (such as brushing teeth) 4  -EWA     Eating meals 4  -EWA     AM-PAC 6 Clicks Score (OT) 24  -EWA       Row Name 05/19/25 2211          How much help from another person do you currently need...    Turning from your back to your side while in flat bed without using bedrails? 4  -CJ     Moving from lying on back to sitting on the side of a flat bed without bedrails? 3  -CJ     Moving to and from a bed to a chair (including a wheelchair)? 3  -CJ     Standing up from a chair using your arms (e.g., wheelchair, bedside chair)? 3  -CJ     Climbing 3-5 steps with a railing? 3  -CJ     To walk in hospital room? 3  -CJ     AM-PAC 6 Clicks Score (PT) 19  -CJ     Highest Level of Mobility Goal Walk 10 Steps  or More-6  -       Row Name 05/20/25 0826          Functional Assessment    Outcome Measure Options AM-PAC 6 Clicks Daily Activity (OT)  -               User Key  (r) = Recorded By, (t) = Taken By, (c) = Cosigned By      Initials Name Provider Type    Sagrario Jane, OT Occupational Therapist    Amina Carballo, RN Registered Nurse                    Occupational Therapy Education       Title: PT OT SLP Therapies (In Progress)       Topic: Occupational Therapy (In Progress)       Point: ADL training (Done)       Learning Progress Summary            Patient Acceptance, E,D, VU by EWA at 5/20/2025 0827    Comment: reason for consult, evaluaiton results, PLB, pulmonary TE                      Point: Home exercise program (Done)       Learning Progress Summary            Patient Acceptance, E,D, VU by EWA at 5/20/2025 0827    Comment: reason for consult, evaluaiton results, PLB, pulmonary TE                      Point: Body mechanics (Done)       Learning Progress Summary            Patient Acceptance, E,D, VU by EWA at 5/20/2025 0827    Comment: reason for consult, evaluaiton results, PLB, pulmonary TE                                      User Key       Initials Effective Dates Name Provider Type Discipline     07/11/23 -  Sagrario Hermosillo, OT Occupational Therapist OT                  OT Recommendation and Plan  Planned Therapy Interventions (OT): activity tolerance training, BADL retraining, patient/caregiver education/training, ROM/therapeutic exercise, occupation/activity based interventions, strengthening exercise  Therapy Frequency (OT): daily  Plan of Care Review  Plan of Care Reviewed With: patient  Progress: improving  Outcome Evaluation: Patient demonstrated impaired activity tolerance impacting PLOF ADLs warranting continued skilled OT services. Pt. demonstrated ability to complete movement OOB, to bathroom, movement in room and performance of BADL tasks without assist, but with new use of 2L NC  and 02 saturation to 88% with activity and 30 second to one minute recovery with PLB.  OT services to focus on education for energy conservation and pulmonary TE/building activity tolerance to support return to PLOF. Recommend home at discharge.     Time Calculation:   Evaluation Complexity (OT)  Review Occupational Profile/Medical/Therapy History Complexity: brief/low complexity  Assessment, Occupational Performance/Identification of Deficit Complexity: 1-3 performance deficits  Clinical Decision Making Complexity (OT): problem focused assessment/low complexity  Overall Complexity of Evaluation (OT): low complexity     Time Calculation- OT       Row Name 05/20/25 0828             Time Calculation- OT    OT Start Time 0757  -EWA      OT Received On 05/20/25  -EWA      OT Goal Re-Cert Due Date 05/27/25  -EWA         Untimed Charges    OT Eval/Re-eval Minutes 35  -EWA         Total Minutes    Untimed Charges Total Minutes 35  -EWA       Total Minutes 35  -EWA                User Key  (r) = Recorded By, (t) = Taken By, (c) = Cosigned By      Initials Name Provider Type    Sagrario Jane, OT Occupational Therapist                  Therapy Charges for Today       Code Description Service Date Service Provider Modifiers Qty    [id number] HC OT EVAL LOW COMPLEXITY 3 5/20/2025 Sagrario Hermosillo OT GO 1                 Sagrario Hermosillo OT  5/20/2025

## 2025-05-20 NOTE — PLAN OF CARE
Goal Outcome Evaluation:  Plan of Care Reviewed With: patient        Progress: improving  Outcome Evaluation: Patient demonstrated impaired activity tolerance impacting PLOF ADLs warranting continued skilled OT services. Pt. demonstrated ability to complete movement OOB, to bathroom, movement in room and performance of BADL tasks without assist, but with new use of 2L NC and 02 saturation to 88% with activity and 30 second to one minute recovery with PLB.  OT services to focus on education for energy conservation and pulmonary TE/building activity tolerance to support return to PLOF. Recommend home at discharge.    Anticipated Discharge Disposition (OT): home

## 2025-05-20 NOTE — PLAN OF CARE
Goal Outcome Evaluation:   VSS. Pt medicated for pain as needed. Denies nausea, vomiting. Tolerating PO intake well. Continues to be on 3 LPM supplemental oxygen through NC with oxygen saturation >92%. Denies any needs at this time.

## 2025-05-21 ENCOUNTER — APPOINTMENT (OUTPATIENT)
Dept: CT IMAGING | Facility: HOSPITAL | Age: 65
End: 2025-05-21
Payer: MEDICARE

## 2025-05-21 LAB
ANION GAP SERPL CALCULATED.3IONS-SCNC: 9 MMOL/L (ref 5–15)
BUN SERPL-MCNC: 17 MG/DL (ref 8–23)
BUN/CREAT SERPL: 23.6 (ref 7–25)
CALCIUM SPEC-SCNC: 8.7 MG/DL (ref 8.6–10.5)
CHLORIDE SERPL-SCNC: 99 MMOL/L (ref 98–107)
CO2 SERPL-SCNC: 31 MMOL/L (ref 22–29)
CREAT SERPL-MCNC: 0.72 MG/DL (ref 0.57–1)
EGFRCR SERPLBLD CKD-EPI 2021: 92.9 ML/MIN/1.73
GLUCOSE SERPL-MCNC: 114 MG/DL (ref 65–99)
HAPTOGLOB SERPL-MCNC: 158 MG/DL (ref 30–200)
MAGNESIUM SERPL-MCNC: 2.3 MG/DL (ref 1.6–2.4)
POTASSIUM SERPL-SCNC: 4.4 MMOL/L (ref 3.5–5.2)
POTASSIUM SERPL-SCNC: 4.4 MMOL/L (ref 3.5–5.2)
SODIUM SERPL-SCNC: 139 MMOL/L (ref 136–145)
VIT B12 BLD-MCNC: 191 PG/ML (ref 211–946)

## 2025-05-21 PROCEDURE — 25010000002 ENOXAPARIN PER 10 MG: Performed by: FAMILY MEDICINE

## 2025-05-21 PROCEDURE — 82607 VITAMIN B-12: CPT

## 2025-05-21 PROCEDURE — 75571 CT HRT W/O DYE W/CA TEST: CPT

## 2025-05-21 PROCEDURE — 25010000002 KETOROLAC TROMETHAMINE PER 15 MG: Performed by: STUDENT IN AN ORGANIZED HEALTH CARE EDUCATION/TRAINING PROGRAM

## 2025-05-21 PROCEDURE — 83735 ASSAY OF MAGNESIUM: CPT

## 2025-05-21 PROCEDURE — 84132 ASSAY OF SERUM POTASSIUM: CPT | Performed by: STUDENT IN AN ORGANIZED HEALTH CARE EDUCATION/TRAINING PROGRAM

## 2025-05-21 PROCEDURE — 99232 SBSQ HOSP IP/OBS MODERATE 35: CPT | Performed by: STUDENT IN AN ORGANIZED HEALTH CARE EDUCATION/TRAINING PROGRAM

## 2025-05-21 PROCEDURE — 80048 BASIC METABOLIC PNL TOTAL CA: CPT

## 2025-05-21 PROCEDURE — 83010 ASSAY OF HAPTOGLOBIN QUANT: CPT

## 2025-05-21 RX ORDER — NYSTATIN AND TRIAMCINOLONE ACETONIDE 100000; 1 [USP'U]/G; MG/G
1 OINTMENT TOPICAL EVERY 12 HOURS SCHEDULED
Status: DISCONTINUED | OUTPATIENT
Start: 2025-05-21 | End: 2025-05-22 | Stop reason: HOSPADM

## 2025-05-21 RX ORDER — BUMETANIDE 1 MG/1
1 TABLET ORAL DAILY
Status: DISCONTINUED | OUTPATIENT
Start: 2025-05-22 | End: 2025-05-22 | Stop reason: HOSPADM

## 2025-05-21 RX ORDER — CHOLECALCIFEROL (VITAMIN D3) 25 MCG
1000 TABLET ORAL DAILY
Status: DISCONTINUED | OUTPATIENT
Start: 2025-05-21 | End: 2025-05-22 | Stop reason: HOSPADM

## 2025-05-21 RX ORDER — LANOLIN ALCOHOL/MO/W.PET/CERES
1000 CREAM (GRAM) TOPICAL DAILY
Status: DISCONTINUED | OUTPATIENT
Start: 2025-05-21 | End: 2025-05-22 | Stop reason: HOSPADM

## 2025-05-21 RX ADMIN — Medication 1000 UNITS: at 11:43

## 2025-05-21 RX ADMIN — DOCUSATE SODIUM 50MG AND SENNOSIDES 8.6MG 2 TABLET: 8.6; 5 TABLET, FILM COATED ORAL at 08:07

## 2025-05-21 RX ADMIN — BISACODYL 5 MG: 5 TABLET, COATED ORAL at 17:04

## 2025-05-21 RX ADMIN — ROSUVASTATIN 10 MG: 10 TABLET, FILM COATED ORAL at 08:07

## 2025-05-21 RX ADMIN — Medication 10 ML: at 08:07

## 2025-05-21 RX ADMIN — PANTOPRAZOLE SODIUM 40 MG: 40 TABLET, DELAYED RELEASE ORAL at 06:45

## 2025-05-21 RX ADMIN — KETOROLAC TROMETHAMINE 15 MG: 15 INJECTION, SOLUTION INTRAMUSCULAR; INTRAVENOUS at 08:07

## 2025-05-21 RX ADMIN — ENOXAPARIN SODIUM 40 MG: 100 INJECTION SUBCUTANEOUS at 17:04

## 2025-05-21 RX ADMIN — NYSTATIN AND TRIAMCINOLONE ACETONIDE 1 APPLICATION: 100000; 1 OINTMENT TOPICAL at 21:23

## 2025-05-21 RX ADMIN — TRAZODONE HYDROCHLORIDE 300 MG: 100 TABLET ORAL at 21:28

## 2025-05-21 RX ADMIN — CYANOCOBALAMIN TAB 1000 MCG 1000 MCG: 1000 TAB at 11:43

## 2025-05-21 RX ADMIN — QUETIAPINE FUMARATE 300 MG: 100 TABLET ORAL at 21:23

## 2025-05-21 RX ADMIN — ENOXAPARIN SODIUM 40 MG: 100 INJECTION SUBCUTANEOUS at 06:45

## 2025-05-21 RX ADMIN — KETOROLAC TROMETHAMINE 15 MG: 15 INJECTION, SOLUTION INTRAMUSCULAR; INTRAVENOUS at 21:28

## 2025-05-21 NOTE — PLAN OF CARE
Goal Outcome Evaluation:              Outcome Evaluation: VSS.  No acute overnight events.  No c/o pain or headache.  NSR on monitor.  3L humidified nasal cannula.  Continue POC.

## 2025-05-21 NOTE — PROGRESS NOTES
[facility] Medicine Services  PROGRESS NOTE    Patient Name: Reshma Akhtar  : 1960  MRN: [id number]    Date of Admission: 2025  Primary Care Physician: Natasha Mccullough DO    Subjective   Subjective     CC:  Nausea, Vomiting, hypoxia    HPI:  Patient seen resting comfortably in bedside chair in NAD.  Denies any respiratory distress currently.  Sent home with oxygen for but oxygen was discontinued when she had appropriate oxygen saturations a month or 2 after discharge.      Objective   Objective     Vital Signs:   Temp:  [97.4 °F (36.3 °C)-98.1 °F (36.7 °C)] 98.1 °F (36.7 °C)  Heart Rate:  [64-81] 70  Resp:  [16-18] 16  BP: ()/(60-67) 122/67  Flow (L/min) (Oxygen Therapy):  [3] 3     Physical Exam  Constitutional:       General: She is not in acute distress.  Cardiovascular:      Rate and Rhythm: Normal rate.      Pulses: Normal pulses.   Pulmonary:      Effort: Pulmonary effort is normal. No respiratory distress.   Abdominal:      General: There is no distension.      Palpations: Abdomen is soft.      Tenderness: There is no abdominal tenderness. There is no guarding or rebound.   Musculoskeletal:      Right lower leg: No edema.      Left lower leg: No edema.   Skin:     General: Skin is warm.   Neurological:      Mental Status: She is alert.   Psychiatric:         Mood and Affect: Mood normal.         Behavior: Behavior normal.           Results Reviewed:  LAB RESULTS:      Lab 25  0427 25  1747 25  1644 25  1522   WBC 4.24  --   --  6.61   HEMOGLOBIN 14.2  --   --  14.4   HEMATOCRIT 43.9  --   --  44.9   PLATELETS 240  --   --  259   NEUTROS ABS  --   --   --  5.48   IMMATURE GRANS (ABS)  --   --   --  0.05   LYMPHS ABS  --   --   --  0.87   MONOS ABS  --   --   --  0.20   EOS ABS  --   --   --  0.00   MCV 99.5*  --   --  99.3*   HSTROP T  --  16* 16* 13         Lab 25  0018 25  1219 25  0427 25  1108  05/18/25  1522   SODIUM 139  --  142  --  140   POTASSIUM 4.4  4.4 3.5 3.2*  --  4.2   CHLORIDE 99  --  97*  --  102   CO2 31.0*  --  32.0*  --  28.4   ANION GAP 9.0  --  13.0  --  9.6   BUN 17  --  12  --  9   CREATININE 0.72  --  0.74  --  0.67   EGFR 92.9  --  89.9  --  97.1   GLUCOSE 114*  --  89  --  144*   CALCIUM 8.7  --  8.8  --  9.4   MAGNESIUM 2.3 2.4 1.5*  --  1.9   PHOSPHORUS  --   --  4.6*  --   --    HEMOGLOBIN A1C  --   --   --  5.60  --          Lab 05/18/25  1522   TOTAL PROTEIN 6.8   ALBUMIN 3.8   GLOBULIN 3.0   ALT (SGPT) 13   AST (SGOT) 17   BILIRUBIN 0.4   ALK PHOS 69   LIPASE 12*         Lab 05/18/25  1747 05/18/25  1644 05/18/25  1522   PROBNP  --   --  1,327.0*   HSTROP T 16* 16* 13         Lab 05/19/25  1108   CHOLESTEROL 139   LDL CHOL 58   HDL CHOL 58   TRIGLYCERIDES 131         Lab 05/21/25  0018 05/20/25  1219   IRON  --  48   IRON SATURATION (TSAT)  --  11*   TIBC  --  451   TRANSFERRIN  --  303   FERRITIN  --  83.18   VITAMIN B 12 191*  --          Lab 05/18/25  1602   PH, ARTERIAL 7.414   PCO2, ARTERIAL 52.3*   PO2 ART 73.4*   FIO2 30   HCO3 ART 33.4*   BASE EXCESS ART 7.1*   CARBOXYHEMOGLOBIN 1.7     Brief Urine Lab Results  (Last result in the past 365 days)        Color   Clarity   Blood   Leuk Est   Nitrite   Protein   CREAT   Urine HCG        05/18/25 1519 Yellow   Clear   Negative   Negative   Negative   Trace                   Microbiology Results Abnormal       None            CT Cardiac Calcium Score Without Dye  Result Date: 5/21/2025  CT CARDIAC CALCIUM SCORE WO DYE Date of Exam: 5/21/2025 1:11 PM EDT Indication: Follow up of atherosclerotic changes seen on radiographic imaging.  Comparison: None available. Technique: Multiple thin section CT axial images were obtained with prospective ECG-gating without intravenous contrast.  Automated exposure control and iterative reconstruction methods were used. Findings: Agatston scores for individual coronary arteries are as  follows: Left main (LM): 0 Left anterior descending (LAD): 29.2 Left circumflex (CX): 70.4 Right coronary artery (RCA): 0 Total 99.6 Visualized lungs are clear. The heart is enlarged with a small pericardial effusion.     Impression: Impression: 1.Total coronary artery calcium score is 99.6. 2.Cardiomegaly with small pericardial effusion. Calcium Score Interpretation 0 -- Negative examination, no identifiable atherosclerotic plaque.  Very low risk of cardiac events in the next 5 years. 1-10 -- Minimal plaque burden.  Low risk of cardiac events in the next 5 years. 11- 100 -- Mild Plaque burden.  Mild risk of cardiac events in the next 5 years. 101 - 400 -- Moderate plaque burden.  Moderate risk of cardiac events in the next 5 years. Over 400 -- Extensive plaque burden.  High risk of cardiac events in the next 5 years. Electronically Signed: Shaun Bautista MD  5/21/2025 1:46 PM EDT  Workstation ID: [id number]      Results for orders placed during the hospital encounter of 05/18/25    Adult Transthoracic Echo Complete W/ Cont if Necessary Per Protocol    Interpretation Summary    Left ventricular systolic function is normal. Calculated left ventricular EF = 63.7% Left ventricular ejection fraction appears to be 61 - 65%.    Left ventricular diastolic function was normal.    Normal cardiac valves.      Current medications:  Scheduled Meds:[Held by provider] bumetanide, 1 mg, Intravenous, Q12H  cholecalciferol, 1,000 Units, Oral, Daily  enoxaparin sodium, 40 mg, Subcutaneous, Q12H  nystatin-triamcinolone, 1 Application, Topical, Q12H  pantoprazole, 40 mg, Oral, Q AM  pharmacy consult - MTM, , Not Applicable, Daily  QUEtiapine, 300 mg, Oral, Nightly  rosuvastatin, 10 mg, Oral, Daily  sodium chloride, 10 mL, Intravenous, Q12H  traZODone, 300 mg, Oral, Nightly  vitamin B-12, 1,000 mcg, Oral, Daily      Continuous Infusions:   PRN Meds:.  aspirin-acetaminophen-caffeine    senna-docusate sodium **AND** polyethylene glycol  **AND** bisacodyl **AND** bisacodyl    Calcium Replacement - Follow Nurse / BPA Driven Protocol    ipratropium-albuterol    ketorolac    Magnesium Cardiology Dose Replacement - Follow Nurse / BPA Driven Protocol    ondansetron    Phosphorus Replacement - Follow Nurse / BPA Driven Protocol    Potassium Replacement - Follow Nurse / BPA Driven Protocol    sodium chloride    sodium chloride    Assessment & Plan   Assessment & Plan     Active Hospital Problems    Diagnosis  POA    **Dyspnea on exertion [R06.09]  Unknown    Hypoxia [R09.02]  Unknown    CHF (congestive heart failure) [I50.9]  Yes    Mixed hyperlipidemia [E78.2]  Yes    Morbid (severe) obesity due to excess calories [E66.01]  Yes    Primary insomnia [F51.01]  Yes      Resolved Hospital Problems   No resolved problems to display.        Brief Hospital Course to date:  Reshma Akhtar is a 64 y.o. female with a past medical history of anxiety, hyperlipidemia, IBS, insomnia, morbid obesity was transferred to  for evaluation and management of hypoxia secondary to CHF exacerbation.  Cardiology consulted upon admission, currently receiving IV diuresis.     HFpEF exacerbation  - Patient presenting with nausea, vomiting, and hypoxia  - CTA chest negative for pulmonary edema, trace pericardial and right pleural effusion  - proBNP elevated at 1300  - Repeat echo with EF of 61 to 65%, normal diastolic function, normal cardiac valves  - Per chart review, patient requiring 1 to 3 L nasal cannula since at least 2022. Patient reports previous need for supplemental oxygen  - CT cardiac calcium score of 99.6, correlating with mild plaque burden  - Concern for obesity hypoventilation syndrome  PLAN  - Cardiology consulted, continuing diuresis while inpatient.  Plan to discharge with 14-day Holter monitor.  Will need sleep study at discharge. No plan for diuretics upon discharge in setting of orthostatic hypotension. Will need sleep medicine  evaluation for RANJIT. Follow up with cardiology in 3 months.   - Strict I's and O's, daily weights  - Wean oxygen for goal oxygen saturation greater than 90%  - Will benefit from outpatient pulmonology follow up  -  working on obtaining home supplemental oxygen.  - PT/OT = home    Reported history of IBS  - CT abdomen pelvis and CT head unremarkable  - Patient reports intermittent nausea and vomiting secondary to reported diagnosis of IBS.  Patient reports she needs gastroenterology referral at discharge. No previous EGD/colonoscopy per chart review. Denies any history of constipation/diarrhea  - Currently asymptomatic without any nausea or vomiting  - Plan for GI referral at discharge    Hyperlipidemia  - Continue statin    GERD  - Continue PPI     Anxiety  Insomnia  - Continue trazodone, seroquel    Headache, chronic  - Takes Tylenol and ibuprofen at home with mild relief.    - CT head unremarkable  - Requesting neurology follow-up outpatient.  Will place referral upon discharge.  - Patient requesting ibuprofen prescription at discharge.    Left eye soreness  - Patient requesting ophthalmology referral discharge    Vitamin B12 and vitamin D deficiency  - Start supplementation    Expected Discharge Location and Transportation: Home  Expected Discharge   Expected discharge date/ time has not been documented.     VTE Prophylaxis:  Pharmacologic VTE prophylaxis orders are present.         AM-PAC 6 Clicks Score (PT): 22 (05/21/25 9418)    CODE STATUS:   Code Status and Medical Interventions: CPR (Attempt to Resuscitate); Full Support   Ordered at: 05/18/25 7052     Code Status (Patient has no pulse and is not breathing):    CPR (Attempt to Resuscitate)     Medical Interventions (Patient has pulse or is breathing):    Full Support     Level Of Support Discussed With:    Patient       Evgeny Cass,   05/21/25

## 2025-05-21 NOTE — CASE MANAGEMENT/SOCIAL WORK
Meet with patient at bedside. She is refusing home health services, case management notified. [facility].

## 2025-05-21 NOTE — PLAN OF CARE
Goal Outcome Evaluation:      VSS. Pt denies pain, nausea, vomiting. Tolerating PO intake well. Continues to be on 3 LPM supplemental oxygen through [place] with oxygen saturation >92%. NSR on tele monitor. Resting in bed. Denies any needs at this time.

## 2025-05-22 ENCOUNTER — READMISSION MANAGEMENT (OUTPATIENT)
Dept: CALL CENTER | Facility: HOSPITAL | Age: 65
End: 2025-05-22
Payer: MEDICAID

## 2025-05-22 VITALS
SYSTOLIC BLOOD PRESSURE: 122 MMHG | OXYGEN SATURATION: 95 % | HEIGHT: 65 IN | DIASTOLIC BLOOD PRESSURE: 75 MMHG | BODY MASS INDEX: 43.93 KG/M2 | HEART RATE: 74 BPM | TEMPERATURE: 97.8 F | RESPIRATION RATE: 16 BRPM | WEIGHT: 263.67 LBS

## 2025-05-22 DIAGNOSIS — R00.2 PALPITATIONS: Primary | ICD-10-CM

## 2025-05-22 PROCEDURE — 97116 GAIT TRAINING THERAPY: CPT

## 2025-05-22 PROCEDURE — 99239 HOSP IP/OBS DSCHRG MGMT >30: CPT | Performed by: NURSE PRACTITIONER

## 2025-05-22 PROCEDURE — 25010000002 KETOROLAC TROMETHAMINE PER 15 MG: Performed by: STUDENT IN AN ORGANIZED HEALTH CARE EDUCATION/TRAINING PROGRAM

## 2025-05-22 PROCEDURE — 25010000002 ENOXAPARIN PER 10 MG: Performed by: FAMILY MEDICINE

## 2025-05-22 PROCEDURE — 99232 SBSQ HOSP IP/OBS MODERATE 35: CPT

## 2025-05-22 RX ORDER — CHOLECALCIFEROL (VITAMIN D3) 25 MCG
1000 TABLET ORAL DAILY
Qty: 30 TABLET | Refills: 0 | Status: SHIPPED | OUTPATIENT
Start: 2025-05-23

## 2025-05-22 RX ORDER — ASCORBIC ACID 1000 MG
500 TABLET ORAL 3 TIMES DAILY
Start: 2025-05-22

## 2025-05-22 RX ADMIN — ENOXAPARIN SODIUM 40 MG: 100 INJECTION SUBCUTANEOUS at 06:10

## 2025-05-22 RX ADMIN — PANTOPRAZOLE SODIUM 40 MG: 40 TABLET, DELAYED RELEASE ORAL at 06:10

## 2025-05-22 RX ADMIN — ROSUVASTATIN 10 MG: 10 TABLET, FILM COATED ORAL at 08:50

## 2025-05-22 RX ADMIN — CYANOCOBALAMIN TAB 1000 MCG 1000 MCG: 1000 TAB at 08:50

## 2025-05-22 RX ADMIN — Medication 1000 UNITS: at 08:50

## 2025-05-22 RX ADMIN — ACETAMINOPHEN, ASPIRIN, CAFFEINE 1 TABLET: 250; 65; 250 TABLET, FILM COATED ORAL at 15:00

## 2025-05-22 RX ADMIN — BUMETANIDE 1 MG: 1 TABLET ORAL at 08:50

## 2025-05-22 RX ADMIN — Medication 10 ML: at 08:49

## 2025-05-22 RX ADMIN — NYSTATIN AND TRIAMCINOLONE ACETONIDE 1 APPLICATION: 100000; 1 OINTMENT TOPICAL at 08:52

## 2025-05-22 RX ADMIN — KETOROLAC TROMETHAMINE 15 MG: 15 INJECTION, SOLUTION INTRAMUSCULAR; INTRAVENOUS at 08:50

## 2025-05-22 NOTE — DISCHARGE SUMMARY
[facility] Medicine Services  DISCHARGE SUMMARY    Patient Name: Reshma Akhtar  : 1960  MRN: [id number]    Date of Admission: 2025  2:32 PM  Date of Discharge:  25    Primary Care Physician: Natasha Mccullough DO    Consults       Date and Time Order Name Status Description    2025  1:15 AM Inpatient Cardiology Consult Completed             Hospital Course     Presenting Problem: Shortness of breath     Active Hospital Problems    Diagnosis  POA    **Dyspnea on exertion [R06.09]  Unknown    Hypoxia [R09.02]  Unknown    CHF (congestive heart failure) [I50.9]  Yes    Mixed hyperlipidemia [E78.2]  Yes    Morbid (severe) obesity due to excess calories [E66.01]  Yes    Primary insomnia [F51.01]  Yes      Resolved Hospital Problems   No resolved problems to display.        Hospital Course:  Reshma Akhtar is a 64 y.o. female with a past medical history of anxiety, hyperlipidemia, IBS, insomnia, morbid obesity who was transferred to [facility] on 2025 for evaluation and management of hypoxia secondary to CHF exacerbation.  Cardiology was consulted upon admission and assisted with optimizing GDMT.  She received intermittent IV diuresis with improvement.  Cardiology recommended 14-day Holter monitor with outpatient sleep medicine referral.  She will follow-up with cardiology in 3 months.  Presenting GI symptoms improved with antiemetics.  She is scheduled to follow-up with GI next month.  Patient's presenting symptoms have improved and she will be discharged home today in stable condition.     HFpEF exacerbation  Acute on chronic hypoxia  - Patient presenting with nausea, vomiting, and hypoxia  - CTA chest negative for pulmonary edema, trace pericardial and right pleural effusion  - proBNP elevated at 1300  - Repeat echo with EF of 61 to 65%, normal diastolic function, normal cardiac valves  - Per chart review, patient requiring 1 to 3 L  nasal cannula since at least 2022. Patient reports previous need for supplemental oxygen  - CT cardiac calcium score of 99.6, correlating with mild plaque burden  - Concern for obesity hypoventilation syndrome  PLAN  - Cardiology followed  - S/p intermittent IV diuresis  - plan to discharge with 14-day Holter monitor.    - Outpatient referral to sleep medicine for sleep study  -Follow up with cardiology in 3 months.   -  has arranged for supplemental oxygen  - PT/OT recommended home but recommended inpatient rehab but the patient has declined and wishes to discharge home  - Patient reports multiple admissions requiring supplemental oxygen upon discharge. However, upon past discharges, patient will run out of oxygen supply 2/2 lack of insurance and therefore discontinue oxygen. Will place pulmonology referral for continued evaluation of chronic hypoxia.     Reported history of IBS  - CT abdomen pelvis and CT head unremarkable  - Patient reports intermittent nausea and vomiting secondary to reported diagnosis of IBS.  Patient reports she needs gastroenterology referral at discharge. No previous EGD/colonoscopy per chart review. Denies any history of constipation/diarrhea  - Currently asymptomatic without any nausea or vomiting  - Scheduled follow-up with GI on 7/24/2025  -Recommended ayush root 500 mg 3 times daily for nausea     Hyperlipidemia  - Continue statin     GERD  - Continue PPI     Anxiety  Insomnia  - Continue trazodone, seroquel     Headache, chronic  - Takes Tylenol and ibuprofen at home with mild relief.    - CT head unremarkable  - Requesting neurology follow-up outpatient for continued evaluation of headaches.  Will place referral upon discharge.  - Patient requesting ibuprofen prescription at discharge.     Left eye soreness  - Patient requesting ophthalmology referral discharge (referral placed prior to admission)     Vitamin B12 and vitamin D deficiency  - Start supplementation      Discharge Follow Up Recommendations for outpatient labs/diagnostics:  Follow-up with PCP in 1 week.  Follow-up with cardiology on 6/30/2025 as scheduled.  Follow-up with GI on 7/24/2025 as scheduled.     Day of Discharge     HPI:   Patient seen resting in bed no apparent distress.  No acute events overnight per nursing.  She is currently feeling well and feels ready to discharge home.  Case management was able to arrange for home oxygen.  She will be discharged home today in stable condition.    Vital Signs:   Temp:  [97.7 °F (36.5 °C)-98.1 °F (36.7 °C)] 97.8 °F (36.6 °C)  Heart Rate:  [65-86] 74  Resp:  [16-18] 16  BP: (122-130)/(62-75) 122/75  Flow (L/min) (Oxygen Therapy):  [2-3] 2    Physical Exam:  Constitutional: No acute distress, awake, alert, chronically ill-appearing  HENT: NCAT, mucous membranes moist  Respiratory: Coarse, diminished in bases, respiratory effort normal   Cardiovascular: RRR, no murmurs, cap refill brisk   Gastrointestinal: Positive bowel sounds, soft, nontender, nondistended  Musculoskeletal: No BLE edema   Psychiatric: Appropriate affect, cooperative  Neurologic: Oriented x 3, moves all extremities, speech clear  Skin: warm, dry, no visible rash       Pertinent  and/or Most Recent Results     LAB RESULTS:      Lab 05/20/25  0427 05/18/25  1522   WBC 4.24 6.61   HEMOGLOBIN 14.2 14.4   HEMATOCRIT 43.9 44.9   PLATELETS 240 259   NEUTROS ABS  --  5.48   IMMATURE GRANS (ABS)  --  0.05   LYMPHS ABS  --  0.87   MONOS ABS  --  0.20   EOS ABS  --  0.00   MCV 99.5* 99.3*         Lab 05/21/25  0018 05/20/25  1219 05/20/25  0427 05/19/25  1108 05/18/25  1522   SODIUM 139  --  142  --  140   POTASSIUM 4.4  4.4 3.5 3.2*  --  4.2   CHLORIDE 99  --  97*  --  102   CO2 31.0*  --  32.0*  --  28.4   ANION GAP 9.0  --  13.0  --  9.6   BUN 17  --  12  --  9   CREATININE 0.72  --  0.74  --  0.67   EGFR 92.9  --  89.9  --  97.1   GLUCOSE 114*  --  89  --  144*   CALCIUM 8.7  --  8.8  --  9.4   MAGNESIUM 2.3  2.4 1.5*  --  1.9   PHOSPHORUS  --   --  4.6*  --   --    HEMOGLOBIN A1C  --   --   --  5.60  --          Lab 05/18/25  1522   TOTAL PROTEIN 6.8   ALBUMIN 3.8   GLOBULIN 3.0   ALT (SGPT) 13   AST (SGOT) 17   BILIRUBIN 0.4   ALK PHOS 69   LIPASE 12*         Lab 05/18/25  1747 05/18/25  1644 05/18/25  1522   PROBNP  --   --  1,327.0*   HSTROP T 16* 16* 13         Lab 05/19/25  1108   CHOLESTEROL 139   LDL CHOL 58   HDL CHOL 58   TRIGLYCERIDES 131         Lab 05/21/25  0018 05/20/25  1219   IRON  --  48   IRON SATURATION (TSAT)  --  11*   TIBC  --  451   TRANSFERRIN  --  303   FERRITIN  --  83.18   VITAMIN B 12 191*  --          Lab 05/18/25  1602   PH, ARTERIAL 7.414   PCO2, ARTERIAL 52.3*   PO2 ART 73.4*   FIO2 30   HCO3 ART 33.4*   BASE EXCESS ART 7.1*   CARBOXYHEMOGLOBIN 1.7     Brief Urine Lab Results  (Last result in the past 365 days)        Color   Clarity   Blood   Leuk Est   Nitrite   Protein   CREAT   Urine HCG        05/18/25 1519 Yellow   Clear   Negative   Negative   Negative   Trace                 Microbiology Results (last 10 days)       Procedure Component Value - Date/Time    Respiratory Panel PCR w/COVID-19(SARS-CoV-2) [facility]O In-House, NP Swab in UTM/VTM, 2 HR TAT - Swab, Nasopharynx [[id number]]  (Normal) Collected: 05/18/25 1519    Lab Status: Final result Specimen: Swab from Nasopharynx Updated: 05/18/25 1622     ADENOVIRUS, PCR Not Detected     Coronavirus 229E Not Detected     Coronavirus HKU1 Not Detected     Coronavirus NL63 Not Detected     Coronavirus OC43 Not Detected     COVID19 Not Detected     Human Metapneumovirus Not Detected     Human Rhinovirus/Enterovirus Not Detected     Influenza A PCR Not Detected     Influenza B PCR Not Detected     Parainfluenza Virus 1 Not Detected     Parainfluenza Virus 2 Not Detected     Parainfluenza Virus 3 Not Detected     Parainfluenza Virus 4 Not Detected     RSV, PCR Not Detected     Bordetella pertussis pcr Not Detected      Bordetella parapertussis PCR Not Detected     Chlamydophila pneumoniae PCR Not Detected     Mycoplasma pneumo by PCR Not Detected    Narrative:      In the setting of a positive respiratory panel with a viral infection PLUS a negative procalcitonin without other underlying concern for bacterial infection, consider observing off antibiotics or discontinuation of antibiotics and continue supportive care. If the respiratory panel is positive for atypical bacterial infection (Bordetella pertussis, Chlamydophila pneumoniae, or Mycoplasma pneumoniae), consider antibiotic de-escalation to target atypical bacterial infection.            CT Cardiac Calcium Score Without Dye  Result Date: 5/21/2025  CT CARDIAC CALCIUM SCORE WO DYE Date of Exam: 5/21/2025 1:11 PM EDT Indication: Follow up of atherosclerotic changes seen on radiographic imaging.  Comparison: None available. Technique: Multiple thin section CT axial images were obtained with prospective ECG-gating without intravenous contrast.  Automated exposure control and iterative reconstruction methods were used. Findings: Agatston scores for individual coronary arteries are as follows: Left main (LM): 0 Left anterior descending (LAD): 29.2 Left circumflex (CX): 70.4 Right coronary artery (RCA): 0 Total 99.6 Visualized lungs are clear. The heart is enlarged with a small pericardial effusion.     Impression: 1.Total coronary artery calcium score is 99.6. 2.Cardiomegaly with small pericardial effusion. Calcium Score Interpretation 0 -- Negative examination, no identifiable atherosclerotic plaque.  Very low risk of cardiac events in the next 5 years. 1-10 -- Minimal plaque burden.  Low risk of cardiac events in the next 5 years. 11- 100 -- Mild Plaque burden.  Mild risk of cardiac events in the next 5 years. 101 - 400 -- Moderate plaque burden.  Moderate risk of cardiac events in the next 5 years. Over 400 -- Extensive plaque burden.  High risk of cardiac events in the next 5  years. Electronically Signed: Shaun Bautista MD  5/21/2025 1:46 PM EDT  Workstation ID: [id number]    CT Angiogram Chest Pulmonary Embolism  Result Date: 5/18/2025  CT ANGIOGRAM CHEST PULMONARY EMBOLISM, CT ABDOMEN PELVIS W CONTRAST Date of Exam: 5/18/2025 4:55 PM EDT Indication: hypoxia. Comparison: None available. Technique: Axial CT images were obtained of the chest after the uneventful intravenous administration of 100 cc Isovue-300 utilizing pulmonary embolism protocol.  In addition, a 3-D volume rendered image was created for interpretation.  Reconstructed coronal and sagittal images were also obtained. Automated exposure control and iterative construction methods were used. Findings: Chest: Pulmonary arteries: Adequately opacified. No emboli demonstrated Kelsie/mediastinum: No adenopathy. Thoracic aorta normal in caliber. Mild coronary artery calcification. Trace pericardial effusion Lungs/pleura: Trace right pleural effusion. No suspicious consolidation or edema. Mild atelectasis in the lower lobes Bones/soft tissues: No acute bony abnormality Abdomen/Pelvis: Organs: Liver, spleen, pancreas, kidneys, adrenal glands unremarkable other than small bilateral renal cysts. Gallbladder surgically absent Gastrointestinal: No intestinal distention or evident wall thickening. Normal appendix Pelvis: No abnormal fluid collection. Reproductive organs within normal limits. Urinary bladder unremarkable Peritoneum/Retroperitoneum: No ascites or pneumoperitoneum. Normal caliber aorta Bones/Soft Tissues: No acute bony abnormality     1. No evidence of pulmonary embolism 2. No acute pulmonary process. 3. Trace pericardial and right pleural effusions 4. No acute abdominopelvic process Electronically Signed: Alexey Covarrubias  5/18/2025 5:27 PM EDT  Workstation ID: [id number]    CT Abdomen Pelvis With Contrast  Result Date: 5/18/2025  CT ANGIOGRAM CHEST PULMONARY EMBOLISM, CT ABDOMEN PELVIS W CONTRAST Date of Exam: 5/18/2025 4:55 PM  EDT Indication: hypoxia. Comparison: None available. Technique: Axial CT images were obtained of the chest after the uneventful intravenous administration of 100 cc Isovue-300 utilizing pulmonary embolism protocol.  In addition, a 3-D volume rendered image was created for interpretation.  Reconstructed coronal and sagittal images were also obtained. Automated exposure control and iterative construction methods were used. Findings: Chest: Pulmonary arteries: Adequately opacified. No emboli demonstrated Kelsie/mediastinum: No adenopathy. Thoracic aorta normal in caliber. Mild coronary artery calcification. Trace pericardial effusion Lungs/pleura: Trace right pleural effusion. No suspicious consolidation or edema. Mild atelectasis in the lower lobes Bones/soft tissues: No acute bony abnormality Abdomen/Pelvis: Organs: Liver, spleen, pancreas, kidneys, adrenal glands unremarkable other than small bilateral renal cysts. Gallbladder surgically absent Gastrointestinal: No intestinal distention or evident wall thickening. Normal appendix Pelvis: No abnormal fluid collection. Reproductive organs within normal limits. Urinary bladder unremarkable Peritoneum/Retroperitoneum: No ascites or pneumoperitoneum. Normal caliber aorta Bones/Soft Tissues: No acute bony abnormality     1. No evidence of pulmonary embolism 2. No acute pulmonary process. 3. Trace pericardial and right pleural effusions 4. No acute abdominopelvic process Electronically Signed: Alexey Covarrubias  5/18/2025 5:27 PM EDT  Workstation ID: [id number]    CT Head Without Contrast  Result Date: 5/18/2025  CT HEAD WO CONTRAST Date of Exam: 5/18/2025 4:55 PM EDT Indication: vomiting/HA. Comparison: None available. Technique: Axial CT images were obtained of the head without contrast administration.  Automated exposure control and iterative construction methods were used. Findings: Gray-white matter differentiation is maintained without evidence of an acute infarction. No  intracranial mass or mass effect. No extra-axial mass or collection. The ventricles and sulci are normal in size and configuration. The posterior fossa appears normal. Sellar and suprasellar structures are normal. Orbital and paranasal soft tissues are normal. The paranasal sinuses, ethmoid air cells, and mastoid air cells are aerated. The bony calvarium appears intact. No acute fractures. No lytic or blastic bony diseases.     Impression: No acute intracranial pathology. Electronically Signed: Bryan Camara MD  5/18/2025 5:22 PM EDT  Workstation ID: [id number]    XR Chest 1 View  Result Date: 5/18/2025  XR CHEST 1 VW Date of Exam: 5/18/2025 3:41 PM EDT Indication: hypoxia Comparison: None available. Findings: No focal consolidation. No pneumothorax or pleural effusion. Cardiomegaly. The visualized clavicles appear intact. No displaced rib fractures. The visualized upper abdomen is normal.     Impression: No acute cardiopulmonary disease. Electronically Signed: Bryan Camara MD  5/18/2025 4:03 PM EDT  Workstation ID: [id number]              Results for orders placed during the hospital encounter of 05/18/25    Adult Transthoracic Echo Complete W/ Cont if Necessary Per Protocol    Interpretation Summary    Left ventricular systolic function is normal. Calculated left ventricular EF = 63.7% Left ventricular ejection fraction appears to be 61 - 65%.    Left ventricular diastolic function was normal.    Normal cardiac valves.      Plan for Follow-up of Pending Labs/Results: Follow-up as directed    Discharge Details        Discharge Medications        New Medications        Instructions Start Date   D3-1000 25 MCG (1000 UT) tablet  Generic drug: cholecalciferol   1,000 Units, Oral, Daily   Start Date: May 23, 2025     Kailey Root 500 MG capsule   500 mg, Oral, 3 times daily      vitamin B-12 1000 MCG tablet  Commonly known as: CYANOCOBALAMIN   1,000 mcg, Oral, Daily   Start Date: May 23, 2025            Continue These  Medications        Instructions Start Date   Diclofenac Sodium 1 % gel gel  Commonly known as: VOLTAREN   4 g, Topical, 4 Times Daily PRN      nystatin-triamcinolone 140194-1.1 UNIT/GM-% ointment  Commonly known as: MYCOLOG   1 Application, Topical, 2 Times Daily      omeprazole 40 MG capsule  Commonly known as: priLOSEC   40 mg, Oral, Daily      QUEtiapine 300 MG tablet  Commonly known as: SEROquel   300 mg, Oral, Nightly      rosuvastatin 10 MG tablet  Commonly known as: CRESTOR   10 mg, Oral, Daily      traZODone 150 MG tablet  Commonly known as: DESYREL   300 mg, Oral, Nightly               Allergies   Allergen Reactions    Codeine Headache    Penicillins Rash         Discharge Disposition: Home  Home or Self Care    Diet:  Hospital:  Diet Order   Procedures    Diet: Cardiac, Fluid Restriction (240 mL/tray); Healthy Heart (2-3 Na+); Other (Specify mL/day) (1800 mL/day); Fluid Consistency: Thin (IDDSI 0)       Diet Instructions       Diet: Regular/House Diet, Cardiac Diets, Fluid Restriction (240 mL/tray) Diets; Healthy Heart (2-3 Na+); Regular (IDDSI 7); Thin (IDDSI 0); Other (Specify mL/day) (1800)      Discharge Diet:  Regular/House Diet  Cardiac Diets  Fluid Restriction (240 mL/tray) Diets       Cardiac Diet: Healthy Heart (2-3 Na+)    Texture: Regular (IDDSI 7)    Fluid Consistency: Thin (IDDSI 0)    Fluid Restriction Diet (240 mL/tray): Other (Specify mL/day) Comment - 1800             Activity: As tolerated  Activity Instructions       Activity as Tolerated                 CODE STATUS:    Code Status and Medical Interventions: CPR (Attempt to Resuscitate); Full Support   Ordered at: 05/18/25 2242     Code Status (Patient has no pulse and is not breathing):    CPR (Attempt to Resuscitate)     Medical Interventions (Patient has pulse or is breathing):    Full Support     Level Of Support Discussed With:    Patient       Future Appointments   Date Time Provider Department Center   5/28/2025  8:15 AM Raiza  DENIS Urban [facility]   5/30/2025  2:30 PM Susy Mccullough DO [facility]   6/30/2025 11:30 AM Salbador Esquivel III, MD [facility]   7/24/2025 12:30 PM Matilda Dennison APRN [facility]       Additional Instructions for the Follow-ups that You Need to Schedule       Ambulatory Referral to Home Health   As directed      Face to Face Visit Date: 5/20/2025   Follow-up provider for Plan of Care?: I treated the patient in an acute care facility and will not continue treatment after discharge.   Follow-up provider: SUSY MCCULLOUGH [[id number]]   Reason/Clinical Findings: Dyspnea on exertion   Describe mobility limitations that make leaving home difficult: Imapired functional mobility, gait, balance and endurance   Nursing/Therapeutic Services Requested: Skilled Nursing Physical Therapy   Skilled nursing orders: Cardiopulmonary assessments Neurovascular assessments   PT orders: Therapeutic exercise Strengthening   Frequency: 1 Week 1        Ambulatory Referral to Neurology   As directed      Per patient request, evaluation of headaches    Order Comments: Per patient request, evaluation of headaches         Ambulatory Referral to Sleep Medicine   As directed      Follow-up needed: Yes (will follow up with Dr. Esquivel)        Discharge Follow-up with PCP   As directed       Currently Documented PCP:    Susy Mccullough DO    PCP Phone Number:    [phone]     Follow Up Details: Follow-up PCP 1 week        Discharge Follow-up with Specialty: Dr. Esquivel; 3 Months   As directed      Specialty: Dr. Esquivel   Follow Up: 3 Months   Follow Up Details: Hospital FU for Volume Overload w/ hypoxia, d/c with 14 day monitor, Sleep medicine referral                      Evgeny Odell DO  05/22/25      Time Spent on Discharge:  I spent  41  minutes on this discharge activity which included: face-to-face encounter with the patient, reviewing the data in the system, coordination of the care with the nursing staff as well as  consultants, documentation, and entering orders.

## 2025-05-22 NOTE — PLAN OF CARE
Goal Outcome Evaluation: Pt denies any complaints overnight s/p receiving toradol for her back pain. VS WDL at this time. POC ongoing. Merissa Siddiqui RN

## 2025-05-22 NOTE — CASE MANAGEMENT/SOCIAL WORK
Case Management Discharge Note      Final Note: I spoke with Ms Akhtar at bedside.  At this time she has refused home health services.  I have attempted to get her insurance to pay for oxygen, but since she only has Medicare part A, she does not have coverage for any DME.  Ms Akhtar has opted to pay out of pocket for oxygen through [employer], and a portable tank has been delivered to bedside.  I did print off information regarding Medicare (parts A, B and D), and attempted to explain how she has hospitalization coverage but not DME or doctor's visit coverage. Ms Akhtar will need an Uber at discharge, and case management will arrange one.  Ms Akhtar also stated that she would not have any clothes to wear home, and is planning on wearing a hospital gown.  I have offered clothing from social work, but at this time Ms Akhtar refused.         Selected Continued Care - Admitted Since 5/18/2025       Destination    No services have been selected for the patient.                Durable Medical Equipment       Service Provider Services Address Phone Fax Patient Preferred    [facility] [address]  [place] [address] [phone] --              Dialysis/Infusion    No services have been selected for the patient.                Home Medical Care       Service Provider Services Address Phone Fax Patient Preferred    [facility] [address] [id number] [phone] --              Therapy    No services have been selected for the patient.                Community Resources    No services have been selected for the patient.                Community & DME    No services have been selected for the patient.                    Transportation Services  Taxi: Suzette    Final Discharge Disposition Code: 01 - home or self-care

## 2025-05-22 NOTE — PLAN OF CARE
Goal Outcome Evaluation:  Plan of Care Reviewed With: patient        Progress: improving  Outcome Evaluation: patient ambulated 84' with SBA, line management for O2, no unsteadiness or LOB noticed. No AD needed, distance limited by weakness and SOA, no other complaints. Recommend rehab at D/C but patient refusing and wants to go home at D/C.

## 2025-05-22 NOTE — PROGRESS NOTES
"[facility] at [facility] Progress Note     LOS: 3 days   Patient Care Team:  Natasha Mccullough DO as PCP - General (Family Medicine)  Salbador Esquivel III, MD as Cardiologist (Cardiology)  Natasha Mccullough DO as Referring Physician (Family Medicine)  Viky Cavanaugh APRN as  (Psychiatry)  PCP:  Natasha Mccullough DO    Chief Complaint:  Volume Overload    Subjective: Ready for discharge home, patient denies any chest pain, worsening BLE edema, orthopnea, syncope. Patient still requiring 2L NC for Spo2 > 90% and dizziness upon standing. Discussed results of calcium score with patient and she had no additional questions.       OBJECTIVE  REVIEW OF SYSTEMS:  @Cardiovascular ROS: positive for - edema and shortness of breath@        Vital Sign Min/Max for last 24 hours  Temp  Min: 97.4 °F (36.3 °C)  Max: 98.1 °F (36.7 °C)   BP  Min: 99/60  Max: 130/75   Pulse  Min: 65  Max: 81   Resp  Min: 16  Max: 18   SpO2  Min: 91 %  Max: 96 %   No data recorded   Weight  Min: 120 kg (263 lb 10.7 oz)  Max: 120 kg (263 lb 10.7 oz)     Telemetry: NSR      I&O/ Weights:     Intake/Output Summary (Last 24 hours) at 5/22/2025 0914  Last data filed at 5/22/2025 0006  Gross per 24 hour   Intake 236 ml   Output --   Net 236 ml         05/21/25  0605 05/22/25  0500   Weight: 117 kg (259 lb) 120 kg (263 lb 10.7 oz)     Flowsheet Rows      Flowsheet Row First Filed Value   Admission Height 165.1 cm (65\") Documented at 05/18/2025 1436   Admission Weight 118 kg (260 lb) Documented at 05/18/2025 1436               Physical Exam:  Vitals reviewed.   Constitutional:       Appearance: Overweight and not in distress.      Interventions: Nasal cannula in place.      Comments: 2L NC   Neck:      Vascular: No JVR. JVD normal.   Pulmonary:      Effort: Pulmonary effort is normal.      Breath sounds: Normal breath sounds.   Cardiovascular:      Normal rate. Regular rhythm.      Murmurs: There is no murmur. "   Pulses:     Intact distal pulses.   Edema:     Peripheral edema present.     Ankle: bilateral trace edema of the ankle.     Feet: bilateral trace edema of the feet.  Musculoskeletal: Normal range of motion. Skin:     General: Skin is warm and dry.   Neurological:      General: No focal deficit present.      Mental Status: Alert.   Psychiatric:         Behavior: Behavior is cooperative.            Labs:   Results from last 7 days   Lab Units 05/20/25  0427 05/18/25  1522   WBC 10*3/mm3 4.24 6.61   HEMOGLOBIN g/dL 14.2 14.4   HEMATOCRIT % 43.9 44.9   PLATELETS 10*3/mm3 240 259     Lab Results   Lab Value Date/Time    TROPONINT 16 (H) 05/18/2025 1747    TROPONINT 16 (H) 05/18/2025 1644    TROPONINT 13 05/18/2025 1522         Results from last 7 days   Lab Units 05/21/25  0018 05/20/25  1219 05/20/25  0427 05/18/25  1522   SODIUM mmol/L 139  --  142 140   POTASSIUM mmol/L 4.4  4.4 3.5 3.2* 4.2   CHLORIDE mmol/L 99  --  97* 102   CO2 mmol/L 31.0*  --  32.0* 28.4   BUN mg/dL 17  --  12 9   CREATININE mg/dL 0.72  --  0.74 0.67   CALCIUM mg/dL 8.7  --  8.8 9.4   BILIRUBIN mg/dL  --   --   --  0.4   ALK PHOS U/L  --   --   --  69   ALT (SGPT) U/L  --   --   --  13   AST (SGOT) U/L  --   --   --  17   GLUCOSE mg/dL 114*  --  89 144*     Results from last 7 days   Lab Units 05/19/25  1108   HEMOGLOBIN A1C % 5.60     Results from last 7 days   Lab Units 05/19/25  1108   CHOLESTEROL mg/dL 139   TRIGLYCERIDES mg/dL 131   HDL CHOL mg/dL 58   LDL CHOL mg/dL 58             Medication Review:   bumetanide, 1 mg, Oral, Daily  cholecalciferol, 1,000 Units, Oral, Daily  enoxaparin sodium, 40 mg, Subcutaneous, Q12H  nystatin-triamcinolone, 1 Application, Topical, Q12H  pantoprazole, 40 mg, Oral, Q AM  pharmacy consult - MTM, , Not Applicable, Daily  QUEtiapine, 300 mg, Oral, Nightly  rosuvastatin, 10 mg, Oral, Daily  sodium chloride, 10 mL, Intravenous, Q12H  traZODone, 300 mg, Oral, Nightly  vitamin B-12, 1,000 mcg, Oral, Daily              IMAGING/DIAGNOSTICS     Results for orders placed during the hospital encounter of 05/18/25    Adult Transthoracic Echo Complete W/ Cont if Necessary Per Protocol    Interpretation Summary    Left ventricular systolic function is normal. Calculated left ventricular EF = 63.7% Left ventricular ejection fraction appears to be 61 - 65%.    Left ventricular diastolic function was normal.    Normal cardiac valves.    CT CARDIAC CALCIUM SCORE:   Findings:  Agatston scores for individual coronary arteries are as follows:  Left main (LM): 0  Left anterior descending (LAD): 29.2  Left circumflex (CX): 70.4  Right coronary artery (RCA): 0  Total 99.6     Visualized lungs are clear. The heart is enlarged with a small pericardial effusion.     IMPRESSION:  Impression:  1.Total coronary artery calcium score is 99.6.  2.Cardiomegaly with small pericardial effusion.     Calcium Score Interpretation  0 -- Negative examination, no identifiable atherosclerotic plaque.  Very low risk of cardiac events in the next 5 years.  1-10 -- Minimal plaque burden.  Low risk of cardiac events in the next 5 years.  11- 100 -- Mild Plaque burden.  Mild risk of cardiac events in the next 5 years.  101 - 400 -- Moderate plaque burden.  Moderate risk of cardiac events in the next 5 years.  Over 400 -- Extensive plaque burden.  High risk of cardiac events in the next 5 years.    Problem List:     Dyspnea on exertion    Primary insomnia    Morbid (severe) obesity due to excess calories    Mixed hyperlipidemia    CHF (congestive heart failure)    Hypoxia         Assessment:  CHF with Dyspnea   CAD  EF 61-65%  Troponin 13 -->16 --> 16, Delta 3,  proBNP 1,327  Bumex 1mg x2 --> Bumex 1mg BID ideally until on RA   Calcification of abdominal aorta discovered via XR spine 2022  CT Calcium score with mild plaque burden, no need for further intervention. Will maintain routine follow-up, statin, and heart healthy lifestyle modifications as management     HLD  Well controlled lipid panel   Crestor 10 mg   HTN, goal < 140/80  Orthostatic Hypotension   Normotensive  Appears to historically have low normal BP's   Documented Orthostatic BP's with symptomatic dizziness   Hypoxia  SPo2 77% on arrival to ED  Small bilateral pleural effusion noted on arrival, receiving diuretics with no change in oxygen requirements.   Normal H&H  Normal EF with mildly elevated proBNP, and mild plaque burden  No hx of COPD/ ILD   Negative for PE  Concern for RANJIT, will send referral at discharge   Will add daily Vitamin B-12 supplementation for low levels   Will place pulmonology consult at discharge   Headahces with Blurred Vision in Left Eye  Complaints of headaches in frontal and parietal area of the head with associated blurriness in left eye.  CT head negative for mass or stroke.  No carotid bruits heard on examination  May need neurology outpatient consult   GERD  IBS  PPI  Frequent N/V, Frequent hospitalizations for the above.  GI consult placed for discharge   DMII  A1C 5.6 today   SSI per attending team    Plan:  Can continue to diuresis with 1mg Bumex daily while inpatient for remainder of mild edema in BLE. Would not discharge home with diuretic with concern for dehydration secondary to frequent N/V and worsening of orthostatic hypotension.  Concern for hypoxia still after diuresis, patient still requiring 3L NC to maintain 90-92%. Will likely need to be sent with home oxygen and need pulmonary consult.   Will need 14 day Holter monitor at discharge for complaints of palpitations.  Will order sleep medicine referral for concern of sleep apnea/ hypoxia   Patient is able to be discharged home from a cardiology perspective with outpatient follow-up within 3 months of discharge.      DENIS Justice

## 2025-05-22 NOTE — THERAPY TREATMENT NOTE
Patient Name: Reshma Akhtar  : 1960    MRN: [id number]                              Today's Date: 2025       Admit Date: 2025    Visit Dx:     ICD-10-CM ICD-9-CM   1. Acute on chronic congestive heart failure, unspecified heart failure type  I50.9 428.0   2. Acute hypoxic respiratory failure  J96.01 518.81   3. Dyspnea on exertion  R06.09 786.09   4. Hypoxia  R09.02 799.02   5. Congestive heart failure, unspecified HF chronicity, unspecified heart failure type  I50.9 428.0   6. Palpitations  R00.2 785.1     Patient Active Problem List   Diagnosis    Primary insomnia    Wellness examination    Morbid (severe) obesity due to excess calories    Mixed hyperlipidemia    Calcification of abdominal aorta    Psychophysiological insomnia    CHF (congestive heart failure)    Hypoxia    Dyspnea on exertion     Past Medical History:   Diagnosis Date    Arthritis     Degenerative disc disease, lumbar     GERD (gastroesophageal reflux disease)      Past Surgical History:   Procedure Laterality Date    CHOLECYSTECTOMY      OOPHORECTOMY        General Information       Row Name 25 1128          Physical Therapy Time and Intention    Document Type therapy note (daily note)  -AS     Mode of Treatment physical therapy  -AS       Row Name 25 1128          General Information    Patient Profile Reviewed yes  -AS     Existing Precautions/Restrictions fall;oxygen therapy device and L/min  -AS     Barriers to Rehab medically complex  -AS       Row Name 25 1128          Cognition    Orientation Status (Cognition) oriented x 4  -AS       Row Name 25 1128          Safety Issues/Impairments Affecting Functional Mobility    Safety Issues Affecting Function (Mobility) safety precaution awareness  patient declines use of gait belt  -AS     Impairments Affecting Function (Mobility) endurance/activity tolerance;shortness of breath;strength  -AS     Comment, Safety Issues/Impairments (Mobility)  alert and following commands  -AS               User Key  (r) = Recorded By, (t) = Taken By, (c) = Cosigned By      Initials Name Provider Type    AS Radha Mckenna PTA Physical Therapist Assistant                   Mobility       Row Name 05/22/25 1129          Bed Mobility    Supine-Sit Barron (Bed Mobility) modified independence  -AS     Assistive Device (Bed Mobility) head of bed elevated  -AS     Comment, (Bed Mobility) no physical assist needed to complete supine>sit  -AS       Row Name 05/22/25 1129          Transfers    Comment, (Transfers) good technique demonstrated, patient refused use of gait belt  -AS       [facility] Name 05/22/25 1129          Bed-Chair Transfer    Bed-Chair Barron (Transfers) unable to assess  -AS       Row Name 05/22/25 1129          Sit-Stand Transfer    Sit-Stand Barron (Transfers) standby assist  -AS     Assistive Device (Sit-Stand Transfers) other (see comments)  -AS     Comment, (Sit-Stand Transfer) no AD needed  -AS       Row Name 05/22/25 1129          Gait/Stairs (Locomotion)    Barron Level (Gait) standby assist  -AS     Assistive Device (Gait) other (see comments)  no AD needed  -AS     Distance in Feet (Gait) 84  -AS     Deviations/Abnormal Patterns (Gait) bilateral deviations;base of support, wide  -AS     Bilateral Gait Deviations heel strike decreased  -AS     Comment, (Gait/Stairs) patient ambulated 84' with SBA, line management only for O2, no unsteadiness or LOB noticed. Distance limited by weakness and SOA, no other complaints.  -AS               User Key  (r) = Recorded By, (t) = Taken By, (c) = Cosigned By      Initials Name Provider Type    AS Radha Mckenna PTA Physical Therapist Assistant                   Obj/Interventions       Row Name 05/22/25 1131          Motor Skills    Therapeutic Exercise --  deferred MD present post tx  -AS       Row Name 05/22/25 1131          Balance    Dynamic Standing Balance standby assist  -AS      Position/Device Used, Standing Balance unsupported  -AS     Comment, Balance no unsteadiness or LOB noticed  -AS               User Key  (r) = Recorded By, (t) = Taken By, (c) = Cosigned By      Initials Name Provider Type    AS Radha Mckenna PTA Physical Therapist Assistant                   Goals/Plan    No documentation.                  Clinical Impression       Row Name 05/22/25 1131          Pain    Pain Location back  -AS     Pain Side/Orientation lower  -AS     Pain Management Interventions activity modification encouraged  -AS     Response to Pain Interventions activity level improved  -AS     Additional Documentation Pain Scale: FACES Pre/Post-Treatment (Group)  -AS       Row Name 05/22/25 1131          Pain Scale: FACES Pre/Post-Treatment    Pain: FACES Scale, Pretreatment 2-->hurts little bit  -AS     Posttreatment Pain Rating 2-->hurts little bit  -AS       Row Name 05/22/25 1131          Plan of Care Review    Plan of Care Reviewed With patient  -AS     Progress improving  -AS     Outcome Evaluation patient ambulated 84' with SBA, line management for O2, no unsteadiness or LOB noticed. No AD needed, distance limited by weakness and SOA, no other complaints. Recommend rehab at D/C but patient refusing and wants to go home at D/C.  -AS       Row Name 05/22/25 1131          Positioning and Restraints    Pre-Treatment Position in bed  -AS     Post Treatment Position bed  -AS     In Bed sitting EOB;call light within reach;encouraged to call for assist;with other staff  -AS               User Key  (r) = Recorded By, (t) = Taken By, (c) = Cosigned By      Initials Name Provider Type    AS Radha Mckenna PTA Physical Therapist Assistant                   Outcome Measures       Row Name 05/22/25 1134          How much help from another person do you currently need...    Turning from your back to your side while in flat bed without using bedrails? 4  -AS     Moving from lying on back to sitting on  the side of a flat bed without bedrails? 4  -AS     Moving to and from a bed to a chair (including a wheelchair)? 4  -AS     Standing up from a chair using your arms (e.g., wheelchair, bedside chair)? 4  -AS     Climbing 3-5 steps with a railing? 3  -AS     To walk in hospital room? 4  -AS     AM-PAC 6 Clicks Score (PT) 23  -AS     Highest Level of Mobility Goal Walk 25 Feet or More-7  -AS       Row Name 05/22/25 1134          Functional Assessment    Outcome Measure Options AM-PAC 6 Clicks Basic Mobility (PT)  -AS               User Key  (r) = Recorded By, (t) = Taken By, (c) = Cosigned By      Initials Name Provider Type    AS Radha Mckenna PTA Physical Therapist Assistant                                 Physical Therapy Education       Title: PT OT SLP Therapies (In Progress)       Topic: Physical Therapy (In Progress)       Point: Mobility training (In Progress)       Learning Progress Summary            Patient Acceptance, E, NR by AS at 5/22/2025 1134    Acceptance, E, VU,NR by NS at 5/19/2025 1553    Comment: benefits of OOB activity                      Point: Home exercise program (In Progress)       Learning Progress Summary            Patient Acceptance, E, NR by AS at 5/22/2025 1134                      Point: Body mechanics (In Progress)       Learning Progress Summary            Patient Acceptance, E, NR by AS at 5/22/2025 1134    Acceptance, E, VU,NR by NS at 5/19/2025 1553    Comment: benefits of OOB activity                      Point: Precautions (In Progress)       Learning Progress Summary            Patient Acceptance, E, NR by AS at 5/22/2025 1134    Acceptance, E, VU,NR by NS at 5/19/2025 1553    Comment: benefits of OOB activity                                      User Key       Initials Effective Dates Name Provider Type Discipline    AS 12/13/24 -  Radha Mckenna, BELKIS Physical Therapist Assistant PT    NS 06/16/21 -  Pretty Mccullough PT Physical Therapist PT                  PT  Recommendation and Plan     Progress: improving  Outcome Evaluation: patient ambulated 84' with SBA, line management for O2, no unsteadiness or LOB noticed. No AD needed, distance limited by weakness and SOA, no other complaints. Recommend rehab at D/C but patient refusing and wants to go home at D/C.     Time Calculation:         PT Charges       Row Name 05/22/25 1134             Time Calculation    Start Time 1116  -AS      PT Received On 05/22/25  -AS      PT Goal Re-Cert Due Date 05/29/25  -AS         Timed Charges    02212 - Gait Training Minutes  23  -AS         Total Minutes    Timed Charges Total Minutes 23  -AS       Total Minutes 23  -AS                User Key  (r) = Recorded By, (t) = Taken By, (c) = Cosigned By      Initials Name Provider Type    AS Radha Mckenna PTA Physical Therapist Assistant                  Therapy Charges for Today       Code Description Service Date Service Provider Modifiers Qty    02288956502 HC GAIT TRAINING EA 15 MIN 5/22/2025 Radha Mckenna PTA GP 2            PT G-Codes  Outcome Measure Options: AM-PAC 6 Clicks Basic Mobility (PT)  AM-PAC 6 Clicks Score (PT): 23  AM-PAC 6 Clicks Score (OT): 24       Radha Mckenna PTA  5/22/2025

## 2025-05-22 NOTE — OUTREACH NOTE
Prep Survey      Flowsheet Row Responses   [facility] patient discharged from? [place]   Is LACE score < 7 ? No   Eligibility [facility]   Date of Admission 05/18/25   Date of Discharge 05/22/25   Discharge Disposition Home or Self Care   Discharge diagnosis Dyspnea on exertion, hypoxia secondary to CHF exacerbation   Does the patient have one of the following disease processes/diagnoses(primary or secondary)? CHF   Does the patient have Home health ordered? Yes   What is the Home health agency?  pt declined HH   Is there a DME ordered? Yes   What DME was ordered? O2 3L NC   Prep survey completed? Yes            Earnestine WAGONER - Registered Nurse

## 2025-05-22 NOTE — PLAN OF CARE
Problem: Adult Inpatient Plan of Care  Goal: Plan of Care Review  Outcome: Met  Goal: Patient-Specific Goal (Individualized)  Outcome: Met  Goal: Absence of Hospital-Acquired Illness or Injury  Outcome: Met  Intervention: Identify and Manage Fall Risk  Intervention: Prevent Skin Injury  Intervention: Prevent Infection  Goal: Optimal Comfort and Wellbeing  Outcome: Met  Intervention: Monitor Pain and Promote Comfort  Intervention: Provide Person-Centered Care  Goal: Readiness for Transition of Care  Outcome: Met     Problem: Comorbidity Management  Goal: Maintenance of Behavioral Health Symptom Control  Outcome: Met  Intervention: Maintain Behavioral Health Symptom Control  Goal: Maintenance of Heart Failure Symptom Control  Outcome: Met  Intervention: Maintain Heart Failure Management     Problem: Fall Injury Risk  Goal: Absence of Fall and Fall-Related Injury  Outcome: Met  Intervention: Identify and Manage Contributors  Intervention: Promote Injury-Free Environment   Goal Outcome Evaluation:

## 2025-05-23 ENCOUNTER — TRANSITIONAL CARE MANAGEMENT TELEPHONE ENCOUNTER (OUTPATIENT)
Dept: CALL CENTER | Facility: HOSPITAL | Age: 65
End: 2025-05-23
Payer: MEDICAID

## 2025-05-23 NOTE — OUTREACH NOTE
Call Center TCM Note      Flowsheet Row Responses   [facility] patient discharged from? [place]   Does the patient have one of the following disease processes/diagnoses(primary or secondary)? CHF   TCM attempt successful? No   Unsuccessful attempts Attempt 1   Call Status Left message             Faustina MENDOZA - Licensed Nurse    5/23/2025, 08:15 EDT

## 2025-05-23 NOTE — OUTREACH NOTE
Call Center TCM Note      Flowsheet Row Responses   [facility] patient discharged from? [place]   Does the patient have one of the following disease processes/diagnoses(primary or secondary)? CHF   TCM attempt successful? No   Unsuccessful attempts Attempt 2             Faustina MENDOZA - Licensed Nurse    5/23/2025, 13:48 EDT

## 2025-05-24 ENCOUNTER — TRANSITIONAL CARE MANAGEMENT TELEPHONE ENCOUNTER (OUTPATIENT)
Dept: CALL CENTER | Facility: HOSPITAL | Age: 65
End: 2025-05-24
Payer: MEDICAID

## 2025-05-24 NOTE — OUTREACH NOTE
Call Center TCM Note      Flowsheet Row Responses   [facility] facility patient discharged from? [place]   Does the patient have one of the following disease processes/diagnoses(primary or secondary)? CHF   TCM attempt successful? No   Unsuccessful attempts Attempt 3  [Clem on verbal release,  no answer]             Fatoumata MURCIA - Registered Nurse    5/24/2025, 10:40 EDT

## 2025-05-27 ENCOUNTER — TELEPHONE (OUTPATIENT)
Dept: CARDIOLOGY | Facility: HOSPITAL | Age: 65
End: 2025-05-27
Payer: MEDICAID

## 2025-05-27 NOTE — TELEPHONE ENCOUNTER
Name: Hermilo Reshmabrian De Jesus    Relationship: Self    Best Callback Number: [phone]     Incoming call to the Hub, requesting to  Reschedule their HFU appointment on 5-28-25.     Per Hub workflow, further review is needed before the task can be completed.    Result of Call: Please reach out to the patient to reschedule  PATIENT NEEDS LATER TIME DUE TO WORK SCHEDULE

## 2025-05-30 ENCOUNTER — TELEMEDICINE (OUTPATIENT)
Dept: FAMILY MEDICINE CLINIC | Facility: CLINIC | Age: 65
End: 2025-05-30
Payer: MEDICAID

## 2025-05-30 DIAGNOSIS — I50.9 CONGESTIVE HEART FAILURE, UNSPECIFIED HF CHRONICITY, UNSPECIFIED HEART FAILURE TYPE: Primary | ICD-10-CM

## 2025-05-30 RX ORDER — BUSPIRONE HYDROCHLORIDE 5 MG/1
5 TABLET ORAL 3 TIMES DAILY
Qty: 90 TABLET | Refills: 0 | Status: SHIPPED | OUTPATIENT
Start: 2025-05-30

## 2025-05-30 NOTE — PROGRESS NOTES
You have chosen to receive care through a telehealth visit.  Do you consent to use a video/audio connection for your medical care today? Yes   Patient and provider in [place].     Chief Complaint  Reshma Akhtar is a 64 y.o. female who presents via video-conference for pain    History of Present Illness        The pt was d/c on 5/22 after being treated for nausea, chf. Per d/c summary she improved with diuresis.   She is wearing the heart monitor.   She has had vomiting she feels is from stress with family issues.   We had started on cymbalta at last visit. It made her very sleepy so she did not continue this. She says her issue is not so much depression now as it is anxiety. She requests medication for this.   She is wearing her oxygen and says she is breathing fine, watching her o2 monitor. No chest pain . She has some leg swelling on and off.       The following portions of the patient's history were reviewed and updated as appropriate: allergies, current medications, past family history, past medical history, past social history, past surgical history, and problem list.        Objective  Vital signs available: No      Assessment/Plan   Hospital follow up  Chf   Anxiety        She was referred to pulmonology, sleep med, cardiology. She is aware of pulm gastro and cardiology apts.   She is working on dietary changes.       Start on buspar. Counseled on risks of irritability suicidal thoughts serotonin syndrome high bp nausea dizziness mood changes.     Natasha Mccullough D.O.  [facility] [place]     15 minutes were spent reviewing the patient's questionnaire, formulating a treatment plan, and relaying information to the patient via [employer].

## 2025-06-02 ENCOUNTER — OFFICE VISIT (OUTPATIENT)
Dept: PULMONOLOGY | Facility: CLINIC | Age: 65
End: 2025-06-02
Payer: MEDICAID

## 2025-06-02 VITALS
TEMPERATURE: 97.1 F | HEIGHT: 65 IN | BODY MASS INDEX: 45.15 KG/M2 | HEART RATE: 82 BPM | OXYGEN SATURATION: 92 % | WEIGHT: 271 LBS | SYSTOLIC BLOOD PRESSURE: 120 MMHG | DIASTOLIC BLOOD PRESSURE: 84 MMHG

## 2025-06-02 DIAGNOSIS — G47.34 NOCTURNAL HYPOXEMIA: ICD-10-CM

## 2025-06-02 DIAGNOSIS — E66.01 MORBID OBESITY WITH BMI OF 45.0-49.9, ADULT: ICD-10-CM

## 2025-06-02 DIAGNOSIS — R09.02 HYPOXIA: ICD-10-CM

## 2025-06-02 DIAGNOSIS — R06.09 DYSPNEA ON EXERTION: Primary | ICD-10-CM

## 2025-06-02 DIAGNOSIS — R94.2 RESTRICTIVE PATTERN PRESENT ON PULMONARY FUNCTION TESTING: ICD-10-CM

## 2025-06-02 DIAGNOSIS — E66.01 MORBID (SEVERE) OBESITY DUE TO EXCESS CALORIES: ICD-10-CM

## 2025-06-02 PROCEDURE — 94729 DIFFUSING CAPACITY: CPT | Performed by: NURSE PRACTITIONER

## 2025-06-02 PROCEDURE — 1160F RVW MEDS BY RX/DR IN RCRD: CPT | Performed by: NURSE PRACTITIONER

## 2025-06-02 PROCEDURE — 1159F MED LIST DOCD IN RCRD: CPT | Performed by: NURSE PRACTITIONER

## 2025-06-02 PROCEDURE — 94726 PLETHYSMOGRAPHY LUNG VOLUMES: CPT | Performed by: NURSE PRACTITIONER

## 2025-06-02 PROCEDURE — 99215 OFFICE O/P EST HI 40 MIN: CPT | Performed by: NURSE PRACTITIONER

## 2025-06-02 PROCEDURE — 94375 RESPIRATORY FLOW VOLUME LOOP: CPT | Performed by: NURSE PRACTITIONER

## 2025-06-02 RX ORDER — FUROSEMIDE 40 MG/1
40 TABLET ORAL DAILY
Qty: 30 TABLET | Refills: 0 | Status: SHIPPED | OUTPATIENT
Start: 2025-06-02

## 2025-06-02 NOTE — PROGRESS NOTES
[facility] Pulmonary [facility] Patient    CHIEF COMPLAINT    Hypoxia    HISTORY OF PRESENT ILLNESS    Reshma Akhtar is a 64 y.o.female with PMH of former tobacco use, anxiety, dyslipidemia, IBS, insomnia, chronic hypoxia, vitamin B12/D deficiency, and morbid obesity recently admitted to [facility] in May 2025 for worsening hypoxia in the setting of decompensated heart failure.  In the chart it appears that the patient has required 1-3 L NC dating back to 2022.  SpO2 was 77% on room air in the setting of bilateral lower extremity edema.  CTA unrevealing for pulmonary embolism but did show trace pericardial and a right pleural effusion in the setting of an elevated BNP.  She was seen by the cardiology NP once during her hospitalization.  Did required intermittent IV diuresis with improvement.  She was discharged on a Holter monitor.  It does not appear she was discharged on any diuretics or GDMT for heart failure and has had worsening lower extremity swelling since discharge.    During her admission it was recommended she undergo a sleep study.    Has gained nearly 100 pounds over the past year.     She has been followed by Dr. Esquivel of cardiology.  Most recent echo during her hospitalization was revealing for diastolic dysfunction, however grade 1 diastolic dysfunction on 2022 OSH echocardiogram.    Does not have any history of a chronic lung disease as a child or adult.    Denies fever, chills, night sweats, or hemoptysis. No recent sick contacts. No chest pain or palpitations. Denies lower extremity swelling or calf tenderness.     Patient Active Problem List   Diagnosis    Primary insomnia    Wellness examination    Morbid (severe) obesity due to excess calories    Mixed hyperlipidemia    Calcification of abdominal aorta    Psychophysiological insomnia    CHF (congestive heart failure)    Hypoxia    Dyspnea on exertion    Nocturnal hypoxemia    Restrictive pattern present on pulmonary function testing       Allergies    Allergen Reactions    Meloxicam Unknown (See Comments)     Other reaction(s): nausea    Codeine Headache    Salicylic Acid Itching     Facial hives, edema, itching    Penicillins Rash       Current Outpatient Medications:     busPIRone (BUSPAR) 5 MG tablet, Take 1 tablet by mouth 3 (Three) Times a Day., Disp: 90 tablet, Rfl: 0    cholecalciferol (VITAMIN D3) 25 MCG (1000 UT) tablet, Take 1 tablet by mouth Daily., Disp: 30 tablet, Rfl: 0    cyanocobalamin (VITAMIN B-12) 1000 MCG tablet, Take 1 tablet by mouth Daily., Disp: 30 tablet, Rfl: 0    Diclofenac Sodium (VOLTAREN) 1 % gel gel, Apply 4 g topically to the appropriate area as directed 4 (Four) Times a Day As Needed (back pain)., Disp: 100 g, Rfl: 1    Ginger, Zingiber officinalis, (Ginger Root) 500 MG capsule, Take 500 mg by mouth 3 times a day., Disp: , Rfl:     nystatin-triamcinolone (MYCOLOG) 633961-1.1 UNIT/GM-% ointment, Apply 1 Application topically to the appropriate area as directed 2 (Two) Times a Day., Disp: 1 g, Rfl: 0    omeprazole (priLOSEC) 40 MG capsule, Take 1 capsule by mouth Daily., Disp: 90 capsule, Rfl: 1    QUEtiapine (SEROquel) 300 MG tablet, Take 1 tablet by mouth Every Night., Disp: 90 tablet, Rfl: 3    rosuvastatin (CRESTOR) 10 MG tablet, Take 1 tablet by mouth Daily., Disp: 90 tablet, Rfl: 3    traZODone (DESYREL) 150 MG tablet, Take 2 tablets by mouth Every Night., Disp: 180 tablet, Rfl: 1    furosemide (Lasix) 40 MG tablet, Take 1 tablet by mouth Daily., Disp: 30 tablet, Rfl: 0  MEDICATION LIST AND ALLERGIES REVIEWED.    Social History     Tobacco Use    Smoking status: Former     Current packs/day: 0.00     Average packs/day: 1 pack/day for 10.0 years (10.0 ttl pk-yrs)     Types: Cigarettes     Start date: 1979     Quit date: 1989     Years since quittin.4     Passive exposure: Never    Smokeless tobacco: Never    Tobacco comments:     I quit 40 years ago   Vaping Use    Vaping status: Never Used   Substance Use Topics  "   Alcohol use: Yes     Alcohol/week: 3.0 standard drinks of alcohol     Types: 3 Glasses of wine per week     Comment: no comment    Drug use: Never       FAMILY AND SOCIAL HISTORY REVIEWED.    Review of Systems   Constitutional:  Negative for chills, fatigue and fever.   Respiratory:  Positive for shortness of breath. Negative for cough, chest tightness and wheezing.    Cardiovascular:  Positive for leg swelling. Negative for chest pain and palpitations.   Skin:  Negative for color change.   Psychiatric/Behavioral:  Negative for sleep disturbance.    All other systems reviewed and are negative.  .    /84   Pulse 82   Temp 97.1 °F (36.2 °C) (Temporal)   Ht 165.1 cm (65\")   Wt 123 kg (271 lb)   SpO2 92% Comment: Room air at rest  BMI 45.10 kg/m²     Immunization History   Administered Date(s) Administered    COVID-19 (PFIZER) Purple Cap Monovalent 07/24/2021, 12/15/2021    Fluzone (or Fluarix & Flulaval for VFC) >6mos 09/20/2018    Influenza, Unspecified 10/15/2009, 09/20/2010, 10/12/2011, 10/18/2012, 10/29/2015, 11/11/2016, 11/09/2017    MMR 03/29/2011    Tdap 06/03/2010       Physical Exam  Vitals reviewed.   Constitutional:       General: She is not in acute distress.     Appearance: Normal appearance.   Cardiovascular:      Rate and Rhythm: Normal rate and regular rhythm.      Pulses: Normal pulses.      Heart sounds: Normal heart sounds.   Pulmonary:      Effort: Pulmonary effort is normal. No respiratory distress.      Breath sounds: No wheezing, rhonchi or rales.   Musculoskeletal:      Right lower leg: Edema present.      Left lower leg: Edema present.   Skin:     General: Skin is warm and dry.   Neurological:      General: No focal deficit present.      Mental Status: She is alert and oriented to person, place, and time.         RESULTS    PFTs:  6/2/2025: No airway obstruction.  Mild restriction with TLC 3.72, 71% predicted.  No air trapping.  Normal DLCO.    Imaging:   CT Cardiac Calcium Score " Without Dye  Result Date: 5/21/2025  Impression: 1.Total coronary artery calcium score is 99.6. 2.Cardiomegaly with small pericardial effusion. Calcium Score Interpretation 0 -- Negative examination, no identifiable atherosclerotic plaque.  Very low risk of cardiac events in the next 5 years. 1-10 -- Minimal plaque burden.  Low risk of cardiac events in the next 5 years. 11- 100 -- Mild Plaque burden.  Mild risk of cardiac events in the next 5 years. 101 - 400 -- Moderate plaque burden.  Moderate risk of cardiac events in the next 5 years. Over 400 -- Extensive plaque burden.  High risk of cardiac events in the next 5 years. Electronically Signed: Shaun Bautista MD  5/21/2025 1:46 PM EDT  Workstation ID: [id number]    CT Angiogram Chest Pulmonary Embolism  Result Date: 5/18/2025  1. No evidence of pulmonary embolism 2. No acute pulmonary process. 3. Trace pericardial and right pleural effusions 4. No acute abdominopelvic process Electronically Signed: Alexey Covarrubias  5/18/2025 5:27 PM EDT  Workstation ID: [id number]    Cardiac:   Results for orders placed during the hospital encounter of 05/18/25  Adult Transthoracic Echo Complete W/ Cont if Necessary Per Protocol  Interpretation Summary    Left ventricular systolic function is normal. Calculated left ventricular EF = 63.7% Left ventricular ejection fraction appears to be 61 - 65%.    Left ventricular diastolic function was normal.    Normal cardiac valves.    [facility] Intracardiac echocardiogram 7/2022     * Left ventricular systolic function is normal with an ejection fraction of   60 % by visual estimation.     * Left ventricular chamber size is normal.     * Left ventricular diastolic function: Grade I diastolic dysfunction.     * Right ventricular chamber dimension is normal.     * Left atrial chamber is mildly enlarged.     * There is mild diffuse thickening (sclerosis) of the aortic valve cusps.     * There is small pericardial effusion with no evidence of tamponade.      PROBLEM LIST    Problem List Items Addressed This Visit       Morbid (severe) obesity due to excess calories    Hypoxia    Relevant Medications    furosemide (Lasix) 40 MG tablet    Other Relevant Orders    Adult Transthoracic Echo Complete W/ Cont if Necessary Per Protocol    Dyspnea on exertion - Primary    Relevant Orders    Spirometry with Diffusion Capacity & Lung Volumes (Completed)    Nocturnal hypoxemia    Relevant Orders    Polysomnography 4 or More Parameters    Restrictive pattern present on pulmonary function testing     Other Visit Diagnoses         Morbid obesity with BMI of 45.0-49.9, adult        Relevant Orders    Ambulatory Referral to Weight Management Program          DISCUSSION    Ms. Akhtar was seen today to establish care with our clinic regarding her hospitalization at [facility] in May for hypoxemia in the setting of decompensated heart failure.  She was treated with IV diuretics with improvement.  She was seen by the cardiology NP during that admission but was not discharged on any heart failure medications or diuretics.    Does have a history of hypoxemia dating back to 2022 of unknown etiology.    Since discharge she has had worsening lower extremity swelling.  Has seen Dr. Esquivel in the past.    Has had a 100 pound weight gain over the past years and symptoms concerning for sleep disordered breathing.    Hypoxia  Likely due to volume overload in the setting of decompensated heart failure.  Did improve with IV diuresis.   Echocardiogram unrevealing, however 2022 study did show grade 1 diastolic dysfunction.  Was not discharged on any diuretics or heart failure GDMT, we will go ahead and place her on 40 mg Lasix daily due to worsening lower extremity swelling.  She does have a follow-up with the cardiology NP next week to discuss further management.  Hypoxia dates back to 2022 of unknown etiology.  Will repeat echocardiogram with bubble study to make sure she does not have any PFO.  Currently  has a Holter monitor in place.  CTA of the chest unrevealing for hypoxia etiology apart from a trace pericardial and right pleural effusion.  PFTs today do show a restrictive defect could be in the setting of volume overload and her morbid obesity.  Given her relatively benign PFTs and chest imaging, strongly suggestive of benign pulmonary etiology of her hypoxemia.  6 MWT at next visit  Continue supplemental oxygen nocturnally and as needed and titrate for an SpO2 greater than 90%.    RANJIT  Nocturnal hypoxemia  The patient does have symptoms concerning for RANJIT including daytime somnolence, non-restorative sleep, fatigue, and witnessed episodes of snoring.   At this time we will proceed with an in-lab polysomnography split night study given her nocturnal oxygen use. I will call the patient with those results.   If the study confirms sleep apnea, initiate PAP therapy with follow-up in 31-90 days for compliance check.   We also discussed alternate treatment strategies for sleep apnea including an oral mandibular adjustment appliance, surgical repair, and/or device insertion.  Body mass index is 45.1 kg/m². I did encourage weight loss as this can contribute towards RANJIT.   Explained that untreated sleep apnea can contribute to additional health problems including cardiac and metabolic issues.   We also discussed good sleep regimen such as laying in the lateral position, avoiding alcohol or sedatives prior to bedtime, regular exercise, weight loss, avoiding caffeine in the evenings, and going to bed and getting up at the same time every day.     Restrictive pattern on PFT  Obesity  Again PFTs show restrictive pattern likely in the setting of her heart failure and morbid obesity.  Cardiac management as documented above.  Body mass index is 45.1 kg/m². Complicates all aspects of care.  Patient has had a weight gain of over 100 pounds over the past year.    Trying to increase her physical activity level, however this is  quite limited due to her joint pain.  Refer to weight management.     I personally spent a total of 43 minutes on patient visit today including chart review, face to face with the patient obtaining the history and physical exam, review of pertinent images and tests, counseling and discussion and/or coordination of care as described above, and documentation.  Total time excludes time spent on other separate services such as performing procedures or test interpretation, if applicable.    Electronically signed by DENIS Jhaveri, 06/02/25, 2:49 PM EDT.    Please note that portions of this note were completed with a voice recognition program.      CC: Natasha Mccullough DO

## 2025-06-03 LAB
QT INTERVAL: 454 MS
QTC INTERVAL: 490 MS

## 2025-06-05 NOTE — PROGRESS NOTES
"[facility]  Heart Failure Clinic    Cardiologist/EP: Dr. Esquivel  PCP: Natasha Mccullough DO      Chief Complaint  Congestive Heart Failure    PROBLEM LIST:  HFpEF  Echo 5/19/25 EF 63%, normal diastolic function  CAD  CCS 99 5/21/25  Calcification of the normal aorta  Hyperlipidemia  IBS    Subjective    History of Present Illness    Reshma Akhtar is a 64 y.o. female who presents today as a hospital referral for HFpEF.    Patient was admitted 5/18 to 5/22 with HFpEF.  She was IV diuresed and cardiology placed 14-day Holter monitor and recommended outpatient sleep medicine referral.  She was arranged for supplemental oxygen, has had several hospitalizations with hypoxia since 2022 of unknown etiology.  She saw pulmonary last week who felt hypoxia was related to decompensated heart failure.  She was placed on Lasix 40 mg daily repeat echo with bubble study was ordered.  Based on PFTs and chest imaging, hypoxia felt not to be pulmonary related.  She was referred for sleep study.    Currently on 3 L of oxygen at home but didn't wear it today and O2 sats 97%. She says it ranges from 80-90s and is not consistent. She recalls having a stress test many years ago. She notes chronic dyspnea and easy fatigue just walking across the room. She has gained about 90lbs in the past year related to \"circumstances\". Moved here to care for her mother who has dementia, whom she did not know that well since she was raised by her father. 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 Has not noticed any improvement in her breathing with lasix but swelling has improved        Dyspnea: stable GOMEZ  Lower extremity swelling: improved  Abdominal swelling: denies  Home weight: Current weight is 259. Does not weigh at home. Usual weight 260  Home BP/HRs: does not check  Orthopnea/pillows denies/2 pillows  Hospital stays: 3/16/2025, 5/18/2025          Objective     Vital Signs:   Vitals:    06/09/25 1453 06/09/25 " "1500   BP: 125/78 127/82   BP Location: Left arm Left arm   Patient Position: Sitting Standing   Pulse: 88 91   SpO2: 97% 97%   Weight: 117 kg (259 lb) 117 kg (259 lb)   Height: 165.1 cm (65\") 165.1 cm (65\")     Body mass index is 43.1 kg/m².  Physical Exam  Vitals reviewed.   Constitutional:       Appearance: Normal appearance.   HENT:      Head: Normocephalic.   Neck:      Vascular: No carotid bruit.   Cardiovascular:      Rate and Rhythm: Normal rate and regular rhythm.      Pulses: Normal pulses.      Heart sounds: Normal heart sounds, S1 normal and S2 normal. No murmur heard.  Pulmonary:      Effort: Pulmonary effort is normal. No respiratory distress.      Breath sounds: Normal breath sounds.   Chest:      Chest wall: No tenderness.   Abdominal:      General: Abdomen is flat.      Palpations: Abdomen is soft.   Musculoskeletal:      Cervical back: Neck supple.      Right lower leg: No edema.      Left lower leg: No edema.   Skin:     General: Skin is warm and dry.   Neurological:      General: No focal deficit present.      Mental Status: She is alert and oriented to person, place, and time. Mental status is at baseline.   Psychiatric:         Mood and Affect: Mood normal.         Behavior: Behavior normal.         Thought Content: Thought content normal.              Data Reviewed:  Lab Results   Component Value Date    GLUCOSE 114 (H) 05/21/2025    CALCIUM 8.7 05/21/2025     05/21/2025    K 4.4 05/21/2025    K 4.4 05/21/2025    CO2 31.0 (H) 05/21/2025    CL 99 05/21/2025    BUN 17 05/21/2025    CREATININE 0.72 05/21/2025    EGFR 92.9 05/21/2025    BCR 23.6 05/21/2025    ANIONGAP 9.0 05/21/2025     Lab Results   Component Value Date    WBC 4.24 05/20/2025    HGB 14.2 05/20/2025    HCT 43.9 05/20/2025    MCV 99.5 (H) 05/20/2025     05/20/2025     Lab Results   Component Value Date    PROBNP 1,327.0 (H) 05/18/2025     Lab Results   Component Value Date    CHOL 139 05/19/2025    CHLPL 160 07/14/2022 "    TRIG 131 05/19/2025    HDL 58 05/19/2025    LDL 58 05/19/2025     Lab Results   Component Value Date    IRON 48 05/20/2025    LABIRON 11 (L) 05/20/2025    TRANSFERRIN 303 05/20/2025    TIBC 451 05/20/2025    FERRITIN 83.18 05/20/2025                    Assessment and Plan   Possible HFpEF  - LVEF 61-65%, NYHA class III   -She appears euvolemic  -Reports prior diagnosis of heart failure, her echo was unremarkable and signs/symptoms seem to be out of proportion for echo results. She did have mildly elevated proBNP  - Heart failure education provided today including signs and symptoms, causes of heart failure, medications, daily weights. Reinforced reasons to call and diuretic plan.   - Basic Metabolic Panel; Future  - proBNP; Future  - Stress Test With Pet Myocardial Perfusion; Future  - Magnesium; Future    2. GOMEZ  - Possible anginal equivalent.  Will do stress PET.  Patient able to walk on treadmill due to severe exertional dyspnea, unable to obtain 4 METS.  Needs pet imaging due to body habitus    3. Coronary artery disease of native artery of native heart with stable angina pectoris  -Coronary calcium scan of 99, continue statin  - Basic Metabolic Panel; Future  - proBNP; Future  - Stress Test With Pet Myocardial Perfusion; Future  - Magnesium; Future    4. Iron deficiency  - Mild iron deficiency, unclear if this could contribute to her exertional dyspnea.  I have sent her in iron supplementation.  She also has been started on B12 supplementation for B12 deficiency    Time Spent: I spent 40 minutes caring for Reshma Akhtar on this date of service. This time includes time spent by me in the following activities: preparing for the visit, reviewing tests, obtaining and/or reviewing a separately obtained history, performing a medically appropriate examination and/or evaluation, counseling and educating the patient/family/caregiver, documenting information in the medical record and independently  interpreting results and communicating that information with the patient/family/caregiver. All time noted occurred on the date of service.    Please note that this explicitly excludes time spent on other separate billable services such as performing venipuncture, IV diuretics, ReDs unit or test interpretation, when applicable.         Follow Up {Instructions Charge Capture  Follow-up Communications :23}   Return if symptoms worsen or fail to improve.      I have reviewed this documentation, made edits where appropriate, and agree with the final report of ReDS data/interpretation. In addition, I have the following to add:    Lung fluid content by ReDS assessment correlates to pulmonary capillary wedge pressure (Jerod et al. JAHA 2018). In patients with LEFT-sided heart failure, elevated readings suggest that the patient MAY benefit from additional diuresis while low readings suggest that the patient MAY benefit from a reduction in diuretics; clinical correlation is recommended. Low normal and mildly elevated readings may be appropriate for some patients. In patients with RIGHT-sided heart failure, lung fluid content may be a less reliable marker for guiding diuresis.    Patient was given instructions and counseling regarding her condition or for health maintenance advice. Please see specific information pulled into the AVS if appropriate.  Advised to call the [facility] with any questions, concerns, or worsening symptoms.

## 2025-06-09 ENCOUNTER — OFFICE VISIT (OUTPATIENT)
Dept: CARDIOLOGY | Facility: HOSPITAL | Age: 65
End: 2025-06-09
Payer: MEDICAID

## 2025-06-09 VITALS
HEIGHT: 65 IN | WEIGHT: 259 LBS | OXYGEN SATURATION: 97 % | BODY MASS INDEX: 43.15 KG/M2 | HEART RATE: 91 BPM | DIASTOLIC BLOOD PRESSURE: 82 MMHG | SYSTOLIC BLOOD PRESSURE: 127 MMHG

## 2025-06-09 DIAGNOSIS — R06.09 DOE (DYSPNEA ON EXERTION): ICD-10-CM

## 2025-06-09 DIAGNOSIS — E61.1 IRON DEFICIENCY: ICD-10-CM

## 2025-06-09 DIAGNOSIS — I25.118 CORONARY ARTERY DISEASE OF NATIVE ARTERY OF NATIVE HEART WITH STABLE ANGINA PECTORIS: ICD-10-CM

## 2025-06-09 DIAGNOSIS — I50.32 CHRONIC HEART FAILURE WITH PRESERVED EJECTION FRACTION (HFPEF): Primary | ICD-10-CM

## 2025-06-09 LAB
ANION GAP SERPL CALCULATED.3IONS-SCNC: 12.2 MMOL/L (ref 5–15)
BUN SERPL-MCNC: 10 MG/DL (ref 8–23)
BUN/CREAT SERPL: 10.6 (ref 7–25)
CALCIUM SPEC-SCNC: 10 MG/DL (ref 8.6–10.5)
CHLORIDE SERPL-SCNC: 98 MMOL/L (ref 98–107)
CO2 SERPL-SCNC: 30.8 MMOL/L (ref 22–29)
CREAT SERPL-MCNC: 0.94 MG/DL (ref 0.57–1)
EGFRCR SERPLBLD CKD-EPI 2021: 67.5 ML/MIN/1.73
GLUCOSE SERPL-MCNC: 103 MG/DL (ref 65–99)
MAGNESIUM SERPL-MCNC: 2 MG/DL (ref 1.6–2.4)
NT-PROBNP SERPL-MCNC: <36 PG/ML (ref 0–900)
POTASSIUM SERPL-SCNC: 3.7 MMOL/L (ref 3.5–5.2)
SODIUM SERPL-SCNC: 141 MMOL/L (ref 136–145)

## 2025-06-09 PROCEDURE — 1160F RVW MEDS BY RX/DR IN RCRD: CPT | Performed by: NURSE PRACTITIONER

## 2025-06-09 PROCEDURE — 99215 OFFICE O/P EST HI 40 MIN: CPT | Performed by: NURSE PRACTITIONER

## 2025-06-09 PROCEDURE — 80048 BASIC METABOLIC PNL TOTAL CA: CPT | Performed by: NURSE PRACTITIONER

## 2025-06-09 PROCEDURE — 1159F MED LIST DOCD IN RCRD: CPT | Performed by: NURSE PRACTITIONER

## 2025-06-09 PROCEDURE — 83880 ASSAY OF NATRIURETIC PEPTIDE: CPT | Performed by: NURSE PRACTITIONER

## 2025-06-09 PROCEDURE — 83735 ASSAY OF MAGNESIUM: CPT | Performed by: NURSE PRACTITIONER

## 2025-06-09 RX ORDER — FERROUS GLUCONATE 324(38)MG
324 TABLET ORAL
Qty: 30 TABLET | Refills: 3 | Status: SHIPPED | OUTPATIENT
Start: 2025-06-09

## 2025-06-10 ENCOUNTER — RESULTS FOLLOW-UP (OUTPATIENT)
Dept: CARDIOLOGY | Facility: HOSPITAL | Age: 65
End: 2025-06-10
Payer: MEDICAID

## 2025-06-10 DIAGNOSIS — R09.02 HYPOXIA: ICD-10-CM

## 2025-06-10 RX ORDER — FUROSEMIDE 20 MG/1
20 TABLET ORAL DAILY
Qty: 30 TABLET | Refills: 2 | Status: SHIPPED | OUTPATIENT
Start: 2025-06-10

## 2025-06-10 NOTE — TELEPHONE ENCOUNTER
Please let patient know that her labs are stable. Her probnp (fluid number) is very low. I do have concerns that the lasix might be increasing her dizziness and therefore would recommend that she decrease Lasix to 20 mg daily.  She can cut her current tablets in half, but I sent in some 20 mg tablets as well

## 2025-06-25 DIAGNOSIS — K21.9 GASTROESOPHAGEAL REFLUX DISEASE, UNSPECIFIED WHETHER ESOPHAGITIS PRESENT: ICD-10-CM

## 2025-06-25 RX ORDER — OMEPRAZOLE 40 MG/1
40 CAPSULE, DELAYED RELEASE ORAL DAILY
Qty: 90 CAPSULE | Refills: 0 | Status: SHIPPED | OUTPATIENT
Start: 2025-06-25

## 2025-06-26 ENCOUNTER — TELEPHONE (OUTPATIENT)
Dept: CARDIOLOGY | Facility: CLINIC | Age: 65
End: 2025-06-26
Payer: MEDICAID

## 2025-06-26 RX ORDER — CHOLECALCIFEROL (VITAMIN D3) 25 MCG
1000 TABLET ORAL DAILY
Qty: 30 TABLET | Refills: 0 | Status: SHIPPED | OUTPATIENT
Start: 2025-06-26

## 2025-06-26 NOTE — TELEPHONE ENCOUNTER
Attempted to reconcile medications. Patient did not answer, unable to leave VM due to box being full.

## 2025-06-27 ENCOUNTER — TELEPHONE (OUTPATIENT)
Dept: FAMILY MEDICINE CLINIC | Facility: CLINIC | Age: 65
End: 2025-06-27
Payer: MEDICAID

## 2025-06-27 ENCOUNTER — PATIENT MESSAGE (OUTPATIENT)
Dept: FAMILY MEDICINE CLINIC | Facility: CLINIC | Age: 65
End: 2025-06-27
Payer: MEDICAID

## 2025-06-30 NOTE — TELEPHONE ENCOUNTER
PA notification of denial for Cyanocobalamin. Response from [facility] indexed to chart.

## 2025-07-14 ENCOUNTER — TELEMEDICINE (OUTPATIENT)
Dept: FAMILY MEDICINE CLINIC | Facility: CLINIC | Age: 65
End: 2025-07-14
Payer: MEDICAID

## 2025-07-14 DIAGNOSIS — F41.9 ANXIETY: Primary | ICD-10-CM

## 2025-07-14 PROCEDURE — 99214 OFFICE O/P EST MOD 30 MIN: CPT | Performed by: STUDENT IN AN ORGANIZED HEALTH CARE EDUCATION/TRAINING PROGRAM

## 2025-07-14 PROCEDURE — 1159F MED LIST DOCD IN RCRD: CPT | Performed by: STUDENT IN AN ORGANIZED HEALTH CARE EDUCATION/TRAINING PROGRAM

## 2025-07-14 PROCEDURE — 3044F HG A1C LEVEL LT 7.0%: CPT | Performed by: STUDENT IN AN ORGANIZED HEALTH CARE EDUCATION/TRAINING PROGRAM

## 2025-07-14 PROCEDURE — 1160F RVW MEDS BY RX/DR IN RCRD: CPT | Performed by: STUDENT IN AN ORGANIZED HEALTH CARE EDUCATION/TRAINING PROGRAM

## 2025-07-14 RX ORDER — NEEDLES, DISPOSABLE 25GX5/8"
1 NEEDLE, DISPOSABLE MISCELLANEOUS
Qty: 6 EACH | Refills: 1 | Status: SHIPPED | OUTPATIENT
Start: 2025-07-14

## 2025-07-14 RX ORDER — FLUOXETINE 10 MG/1
10 CAPSULE ORAL DAILY
Qty: 60 CAPSULE | Refills: 0 | Status: SHIPPED | OUTPATIENT
Start: 2025-07-14

## 2025-07-14 RX ORDER — CYANOCOBALAMIN, ISOPROPYL ALCOHOL 1000MCG/ML
1000 KIT INJECTION
Qty: 1 KIT | Refills: 11 | Status: SHIPPED | OUTPATIENT
Start: 2025-07-14

## 2025-07-14 NOTE — PROGRESS NOTES
"You have chosen to receive care through a telehealth visit.  Do you consent to use a video/audio connection for your medical care today? Yes     Chief Complaint  Reshma Akhtar is a 65 y.o. female who presents via video-conference for medications    History of Present Illness      The patient says she did not like the buspar. It made her feel \"weird inside\". Prozac worked for her in the past. She reports still having a lot of stress with her family. She had some panic attacks in the night . She worked on self calming and prayer and this helped the panic. She generally just has a feeling of anxiety.     She also reports having generalized lack of energy. She has checked her pulse ox at times and it has been in the 80s. She is not acutely sob or having any chest pain/dizziness but she will feel dizzy at times.       The following portions of the patient's history were reviewed and updated as appropriate: allergies, current medications, past family history, past medical history, past social history, past surgical history, and problem list.        Objective  Vital signs available: No      Assessment/Plan   Anxiety    Start on prozac. She tolerated this well previously. Counseled on risks of serotonin syndrome.   Discussed with the patient this med has other side effects such as suicidal thoughts, depressive symptoms or elevated mood, insomnia, abnormal dreams. The patient will seek care if these occur.   It cannot be stopped abruptly.   Avoid with alcohol.     I asked the patient to call and schedule her stress and echo. She is agreeable.    I asked the patient to come in for an in person visit this week for us to check her o2 vitals labs EKG and examine her in person. She is agreeable.       Natasha Mccullough D.O.  [facility] [place]     15 minutes were spent reviewing the patient's questionnaire, formulating a treatment plan, and relaying information to the patient via [employer].  "

## 2025-07-16 ENCOUNTER — TELEPHONE (OUTPATIENT)
Dept: FAMILY MEDICINE CLINIC | Facility: CLINIC | Age: 65
End: 2025-07-16
Payer: MEDICAID

## 2025-07-16 NOTE — TELEPHONE ENCOUNTER
"  Caller: Reshma Akhtar \"Reshma Akhtar\"    Relationship: Self    Best call back number: [phone]    What is the best time to reach you: ANY TIME    Who are you requesting to speak with (clinical staff, provider,  specific staff member): DR BRUNSON    Do you know the name of the person who called: SELF    What was the call regarding: PATIENT HAD FORGOTTEN WHAT LABS PROVIDER WANTED PATIENT TO GET PER THE PHONE CALL ON 07/15/2025. PLEASE CALL PATIENT BACK      "

## 2025-07-17 NOTE — TELEPHONE ENCOUNTER
For now I would just like to see her in person and then we can discuss labs imaging and any further testing needed. Can we get her in for tomorrow ?

## 2025-07-22 RX ORDER — CHOLECALCIFEROL (VITAMIN D3) 25 MCG
1000 TABLET ORAL DAILY
Qty: 30 TABLET | Refills: 0 | Status: SHIPPED | OUTPATIENT
Start: 2025-07-22

## 2025-08-11 ENCOUNTER — APPOINTMENT (OUTPATIENT)
Dept: GENERAL RADIOLOGY | Facility: HOSPITAL | Age: 65
End: 2025-08-11
Payer: MEDICAID

## 2025-08-11 ENCOUNTER — HOSPITAL ENCOUNTER (OUTPATIENT)
Facility: HOSPITAL | Age: 65
Setting detail: OBSERVATION
Discharge: HOME | End: 2025-08-14
Attending: EMERGENCY MEDICINE | Admitting: STUDENT IN AN ORGANIZED HEALTH CARE EDUCATION/TRAINING PROGRAM
Payer: MEDICAID

## 2025-08-11 DIAGNOSIS — R07.89 CHEST PRESSURE: ICD-10-CM

## 2025-08-11 DIAGNOSIS — J96.22 ACUTE ON CHRONIC RESPIRATORY FAILURE WITH HYPOXIA AND HYPERCAPNIA: Primary | ICD-10-CM

## 2025-08-11 DIAGNOSIS — J96.21 ACUTE ON CHRONIC RESPIRATORY FAILURE WITH HYPOXIA AND HYPERCAPNIA: Primary | ICD-10-CM

## 2025-08-11 DIAGNOSIS — Z86.79 HISTORY OF CHF (CONGESTIVE HEART FAILURE): ICD-10-CM

## 2025-08-11 DIAGNOSIS — R09.02 HYPOXIA: ICD-10-CM

## 2025-08-11 DIAGNOSIS — R53.1 GENERALIZED WEAKNESS: ICD-10-CM

## 2025-08-11 DIAGNOSIS — R79.89 ELEVATED TROPONIN: ICD-10-CM

## 2025-08-11 DIAGNOSIS — N39.0 ACUTE UTI: ICD-10-CM

## 2025-08-11 LAB
ALBUMIN SERPL-MCNC: 3.8 G/DL (ref 3.5–5.2)
ALBUMIN/GLOB SERPL: 1.5 G/DL
ALP SERPL-CCNC: 61 U/L (ref 39–117)
ALT SERPL W P-5'-P-CCNC: 25 U/L (ref 1–33)
ANION GAP SERPL CALCULATED.3IONS-SCNC: 11 MMOL/L (ref 5–15)
ARTERIAL PATENCY WRIST A: ABNORMAL
AST SERPL-CCNC: 54 U/L (ref 1–32)
ATMOSPHERIC PRESS: ABNORMAL MM[HG]
BACTERIA UR QL AUTO: ABNORMAL /HPF
BASE EXCESS BLDA CALC-SCNC: 7.4 MMOL/L (ref 0–2)
BASOPHILS # BLD AUTO: 0.02 10*3/MM3 (ref 0–0.2)
BASOPHILS NFR BLD AUTO: 0.3 % (ref 0–1.5)
BDY SITE: ABNORMAL
BILIRUB SERPL-MCNC: 0.3 MG/DL (ref 0–1.2)
BILIRUB UR QL STRIP: NEGATIVE
BODY TEMPERATURE: 37
BUN SERPL-MCNC: 8.9 MG/DL (ref 8–23)
BUN/CREAT SERPL: 12.2 (ref 7–25)
CALCIUM SPEC-SCNC: 8.8 MG/DL (ref 8.6–10.5)
CHLORIDE SERPL-SCNC: 100 MMOL/L (ref 98–107)
CLARITY UR: ABNORMAL
CO2 BLDA-SCNC: 35.9 MMOL/L (ref 22–33)
CO2 SERPL-SCNC: 31 MMOL/L (ref 22–29)
COHGB MFR BLD: 1.3 % (ref 0–2)
COLOR UR: ABNORMAL
CREAT SERPL-MCNC: 0.73 MG/DL (ref 0.57–1)
DEPRECATED RDW RBC AUTO: 53.6 FL (ref 37–54)
EGFRCR SERPLBLD CKD-EPI 2021: 90.8 ML/MIN/1.73
EOSINOPHIL # BLD AUTO: 0.03 10*3/MM3 (ref 0–0.4)
EOSINOPHIL NFR BLD AUTO: 0.5 % (ref 0.3–6.2)
EPAP: 0
ERYTHROCYTE [DISTWIDTH] IN BLOOD BY AUTOMATED COUNT: 14.7 % (ref 12.3–15.4)
GEN 5 1HR TROPONIN T REFLEX: 36 NG/L
GLOBULIN UR ELPH-MCNC: 2.5 GM/DL
GLUCOSE SERPL-MCNC: 96 MG/DL (ref 65–99)
GLUCOSE UR STRIP-MCNC: NEGATIVE MG/DL
HCO3 BLDA-SCNC: 34.1 MMOL/L (ref 20–26)
HCT VFR BLD AUTO: 39.8 % (ref 34–46.6)
HCT VFR BLD CALC: 39.9 % (ref 38–51)
HGB BLD-MCNC: 12.6 G/DL (ref 12–15.9)
HGB BLDA-MCNC: 13 G/DL (ref 14–18)
HGB UR QL STRIP.AUTO: ABNORMAL
HOLD SPECIMEN: NORMAL
HYALINE CASTS UR QL AUTO: ABNORMAL /LPF
IMM GRANULOCYTES # BLD AUTO: 0.01 10*3/MM3 (ref 0–0.05)
IMM GRANULOCYTES NFR BLD AUTO: 0.2 % (ref 0–0.5)
INHALED O2 CONCENTRATION: 36 %
IPAP: 0
KETONES UR QL STRIP: ABNORMAL
LEUKOCYTE ESTERASE UR QL STRIP.AUTO: ABNORMAL
LYMPHOCYTES # BLD AUTO: 1.15 10*3/MM3 (ref 0.7–3.1)
LYMPHOCYTES NFR BLD AUTO: 17.4 % (ref 19.6–45.3)
MCH RBC QN AUTO: 32.1 PG (ref 26.6–33)
MCHC RBC AUTO-ENTMCNC: 31.7 G/DL (ref 31.5–35.7)
MCV RBC AUTO: 101.5 FL (ref 79–97)
METHGB BLD QL: 0.5 % (ref 0–1.5)
MODALITY: ABNORMAL
MONOCYTES # BLD AUTO: 0.55 10*3/MM3 (ref 0.1–0.9)
MONOCYTES NFR BLD AUTO: 8.3 % (ref 5–12)
MUCOUS THREADS URNS QL MICRO: ABNORMAL /HPF
NEUTROPHILS NFR BLD AUTO: 4.85 10*3/MM3 (ref 1.7–7)
NEUTROPHILS NFR BLD AUTO: 73.3 % (ref 42.7–76)
NITRITE UR QL STRIP: NEGATIVE
NRBC BLD AUTO-RTO: 0 /100 WBC (ref 0–0.2)
NT-PROBNP SERPL-MCNC: 804 PG/ML (ref 0–900)
OXYHGB MFR BLDV: 92 % (ref 94–99)
PAW @ PEAK INSP FLOW SETTING VENT: 0 CMH2O
PCO2 BLDA: 56.9 MM HG (ref 35–45)
PCO2 TEMP ADJ BLD: 56.9 MM HG (ref 35–45)
PH BLDA: 7.39 PH UNITS (ref 7.35–7.45)
PH UR STRIP.AUTO: 5.5 [PH] (ref 5–8)
PH, TEMP CORRECTED: 7.39 PH UNITS
PLATELET # BLD AUTO: 254 10*3/MM3 (ref 140–450)
PMV BLD AUTO: 8.9 FL (ref 6–12)
PO2 BLDA: 70.5 MM HG (ref 83–108)
PO2 TEMP ADJ BLD: 70.5 MM HG (ref 83–108)
POTASSIUM SERPL-SCNC: 3.7 MMOL/L (ref 3.5–5.2)
PROT SERPL-MCNC: 6.3 G/DL (ref 6–8.5)
PROT UR QL STRIP: ABNORMAL
RBC # BLD AUTO: 3.92 10*6/MM3 (ref 3.77–5.28)
RBC # UR STRIP: ABNORMAL /HPF
REF LAB TEST METHOD: ABNORMAL
SODIUM SERPL-SCNC: 142 MMOL/L (ref 136–145)
SP GR UR STRIP: 1.03 (ref 1–1.03)
SQUAMOUS #/AREA URNS HPF: ABNORMAL /HPF
TOTAL RATE: 0 BREATHS/MINUTE
TROPONIN T % DELTA: -14
TROPONIN T NUMERIC DELTA: -6 NG/L
TROPONIN T SERPL HS-MCNC: 42 NG/L
TSH SERPL DL<=0.05 MIU/L-ACNC: 1.61 UIU/ML (ref 0.27–4.2)
UROBILINOGEN UR QL STRIP: ABNORMAL
WBC # UR STRIP: ABNORMAL /HPF
WBC NRBC COR # BLD AUTO: 6.61 10*3/MM3 (ref 3.4–10.8)
WHOLE BLOOD HOLD COAG: NORMAL
WHOLE BLOOD HOLD SPECIMEN: NORMAL
YEAST URNS QL MICRO: ABNORMAL /HPF

## 2025-08-11 PROCEDURE — 93005 ELECTROCARDIOGRAM TRACING: CPT | Performed by: EMERGENCY MEDICINE

## 2025-08-11 PROCEDURE — 82805 BLOOD GASES W/O2 SATURATION: CPT

## 2025-08-11 PROCEDURE — 85379 FIBRIN DEGRADATION QUANT: CPT | Performed by: NURSE PRACTITIONER

## 2025-08-11 PROCEDURE — 84484 ASSAY OF TROPONIN QUANT: CPT | Performed by: EMERGENCY MEDICINE

## 2025-08-11 PROCEDURE — 83050 HGB METHEMOGLOBIN QUAN: CPT

## 2025-08-11 PROCEDURE — 81001 URINALYSIS AUTO W/SCOPE: CPT | Performed by: EMERGENCY MEDICINE

## 2025-08-11 PROCEDURE — 36415 COLL VENOUS BLD VENIPUNCTURE: CPT

## 2025-08-11 PROCEDURE — 83735 ASSAY OF MAGNESIUM: CPT | Performed by: NURSE PRACTITIONER

## 2025-08-11 PROCEDURE — 83880 ASSAY OF NATRIURETIC PEPTIDE: CPT | Performed by: EMERGENCY MEDICINE

## 2025-08-11 PROCEDURE — 84443 ASSAY THYROID STIM HORMONE: CPT | Performed by: EMERGENCY MEDICINE

## 2025-08-11 PROCEDURE — 99285 EMERGENCY DEPT VISIT HI MDM: CPT

## 2025-08-11 PROCEDURE — 80053 COMPREHEN METABOLIC PANEL: CPT | Performed by: EMERGENCY MEDICINE

## 2025-08-11 PROCEDURE — 82375 ASSAY CARBOXYHB QUANT: CPT

## 2025-08-11 PROCEDURE — 71045 X-RAY EXAM CHEST 1 VIEW: CPT

## 2025-08-11 PROCEDURE — 36600 WITHDRAWAL OF ARTERIAL BLOOD: CPT

## 2025-08-11 PROCEDURE — 85025 COMPLETE CBC W/AUTO DIFF WBC: CPT | Performed by: EMERGENCY MEDICINE

## 2025-08-11 PROCEDURE — 84145 PROCALCITONIN (PCT): CPT | Performed by: EMERGENCY MEDICINE

## 2025-08-11 RX ORDER — SODIUM CHLORIDE 0.9 % (FLUSH) 0.9 %
10 SYRINGE (ML) INJECTION AS NEEDED
Status: DISCONTINUED | OUTPATIENT
Start: 2025-08-11 | End: 2025-08-14 | Stop reason: HOSPADM

## 2025-08-12 ENCOUNTER — APPOINTMENT (OUTPATIENT)
Dept: CARDIOLOGY | Facility: HOSPITAL | Age: 65
End: 2025-08-12
Payer: MEDICAID

## 2025-08-12 PROBLEM — F51.04 PSYCHOPHYSIOLOGICAL INSOMNIA: Status: RESOLVED | Noted: 2024-10-08 | Resolved: 2025-08-12

## 2025-08-12 PROBLEM — I10 BENIGN ESSENTIAL HYPERTENSION: Status: ACTIVE | Noted: 2020-05-21

## 2025-08-12 PROBLEM — I50.9 ACUTE EXACERBATION OF CHF (CONGESTIVE HEART FAILURE): Status: ACTIVE | Noted: 2025-08-12

## 2025-08-12 PROBLEM — K21.9 GASTROESOPHAGEAL REFLUX DISEASE: Status: ACTIVE | Noted: 2025-08-12

## 2025-08-12 PROBLEM — J96.21 ACUTE ON CHRONIC RESPIRATORY FAILURE WITH HYPOXIA: Status: ACTIVE | Noted: 2025-08-12

## 2025-08-12 LAB
ANION GAP SERPL CALCULATED.3IONS-SCNC: 10 MMOL/L (ref 5–15)
B PARAPERT DNA SPEC QL NAA+PROBE: NOT DETECTED
B PERT DNA SPEC QL NAA+PROBE: NOT DETECTED
BASOPHILS # BLD AUTO: 0.01 10*3/MM3 (ref 0–0.2)
BASOPHILS NFR BLD AUTO: 0.2 % (ref 0–1.5)
BH CV REST NUCLEAR ISOTOPE DOSE: 27.4 MCI
BH CV STRESS BP STAGE 2: NORMAL
BH CV STRESS BP STAGE 4: NORMAL
BH CV STRESS COMMENTS STAGE 1: NORMAL
BH CV STRESS DOSE REGADENOSON STAGE 1: 0.4
BH CV STRESS DURATION MIN STAGE 1: 1
BH CV STRESS DURATION MIN STAGE 2: 1
BH CV STRESS DURATION MIN STAGE 3: 1
BH CV STRESS DURATION MIN STAGE 4: 1
BH CV STRESS DURATION SEC STAGE 1: 0
BH CV STRESS DURATION SEC STAGE 2: 0
BH CV STRESS DURATION SEC STAGE 3: 0
BH CV STRESS DURATION SEC STAGE 4: 0
BH CV STRESS HR STAGE 1: 70
BH CV STRESS HR STAGE 2: 78
BH CV STRESS HR STAGE 3: 81
BH CV STRESS HR STAGE 4: 79
BH CV STRESS NUCLEAR ISOTOPE DOSE: 27.4 MCI
BH CV STRESS O2 STAGE 1: 95
BH CV STRESS O2 STAGE 2: 96
BH CV STRESS O2 STAGE 3: 95
BH CV STRESS O2 STAGE 4: 96
BH CV STRESS PROTOCOL 1: NORMAL
BH CV STRESS RECOVERY BP: NORMAL MMHG
BH CV STRESS RECOVERY HR: 75 BPM
BH CV STRESS RECOVERY O2: 93 %
BH CV STRESS STAGE 1: 1
BH CV STRESS STAGE 2: 2
BH CV STRESS STAGE 3: 3
BH CV STRESS STAGE 4: 4
BUN SERPL-MCNC: 9.4 MG/DL (ref 8–23)
BUN/CREAT SERPL: 13.8 (ref 7–25)
C PNEUM DNA NPH QL NAA+NON-PROBE: NOT DETECTED
CALCIUM SPEC-SCNC: 8.9 MG/DL (ref 8.6–10.5)
CHLORIDE SERPL-SCNC: 99 MMOL/L (ref 98–107)
CHOLEST SERPL-MCNC: 150 MG/DL (ref 0–200)
CO2 SERPL-SCNC: 34 MMOL/L (ref 22–29)
CREAT SERPL-MCNC: 0.68 MG/DL (ref 0.57–1)
D DIMER PPP FEU-MCNC: 0.28 MCGFEU/ML (ref 0–0.66)
D-LACTATE SERPL-SCNC: 0.7 MMOL/L (ref 0.5–2)
DEPRECATED RDW RBC AUTO: 53 FL (ref 37–54)
EGFRCR SERPLBLD CKD-EPI 2021: 96.2 ML/MIN/1.73
EOSINOPHIL # BLD AUTO: 0 10*3/MM3 (ref 0–0.4)
EOSINOPHIL NFR BLD AUTO: 0 % (ref 0.3–6.2)
ERYTHROCYTE [DISTWIDTH] IN BLOOD BY AUTOMATED COUNT: 14.4 % (ref 12.3–15.4)
FLUAV SUBTYP SPEC NAA+PROBE: NOT DETECTED
FLUBV RNA NPH QL NAA+NON-PROBE: NOT DETECTED
GLUCOSE SERPL-MCNC: 142 MG/DL (ref 65–99)
HADV DNA SPEC NAA+PROBE: NOT DETECTED
HBA1C MFR BLD: 5.72 % (ref 4.8–5.6)
HCOV 229E RNA SPEC QL NAA+PROBE: NOT DETECTED
HCOV HKU1 RNA SPEC QL NAA+PROBE: NOT DETECTED
HCOV NL63 RNA SPEC QL NAA+PROBE: NOT DETECTED
HCOV OC43 RNA SPEC QL NAA+PROBE: NOT DETECTED
HCT VFR BLD AUTO: 44.1 % (ref 34–46.6)
HDLC SERPL-MCNC: 63 MG/DL (ref 40–60)
HGB BLD-MCNC: 14.3 G/DL (ref 12–15.9)
HMPV RNA NPH QL NAA+NON-PROBE: NOT DETECTED
HPIV1 RNA ISLT QL NAA+PROBE: NOT DETECTED
HPIV2 RNA SPEC QL NAA+PROBE: NOT DETECTED
HPIV3 RNA NPH QL NAA+PROBE: NOT DETECTED
HPIV4 P GENE NPH QL NAA+PROBE: NOT DETECTED
IMM GRANULOCYTES # BLD AUTO: 0.03 10*3/MM3 (ref 0–0.05)
IMM GRANULOCYTES NFR BLD AUTO: 0.5 % (ref 0–0.5)
LDLC SERPL CALC-MCNC: 71 MG/DL (ref 0–100)
LDLC/HDLC SERPL: 1.12 {RATIO}
LYMPHOCYTES # BLD AUTO: 0.72 10*3/MM3 (ref 0.7–3.1)
LYMPHOCYTES NFR BLD AUTO: 12.7 % (ref 19.6–45.3)
M PNEUMO IGG SER IA-ACNC: NOT DETECTED
MAGNESIUM SERPL-MCNC: 1.8 MG/DL (ref 1.6–2.4)
MAXIMAL PREDICTED HEART RATE: 154 BPM
MCH RBC QN AUTO: 32.9 PG (ref 26.6–33)
MCHC RBC AUTO-ENTMCNC: 32.4 G/DL (ref 31.5–35.7)
MCV RBC AUTO: 101.4 FL (ref 79–97)
MONOCYTES # BLD AUTO: 0.05 10*3/MM3 (ref 0.1–0.9)
MONOCYTES NFR BLD AUTO: 0.9 % (ref 5–12)
NEUTROPHILS NFR BLD AUTO: 4.88 10*3/MM3 (ref 1.7–7)
NEUTROPHILS NFR BLD AUTO: 85.7 % (ref 42.7–76)
NRBC BLD AUTO-RTO: 0 /100 WBC (ref 0–0.2)
PERCENT MAX PREDICTED HR: 52.6 %
PLATELET # BLD AUTO: 273 10*3/MM3 (ref 140–450)
PMV BLD AUTO: 8.9 FL (ref 6–12)
POTASSIUM SERPL-SCNC: 3.9 MMOL/L (ref 3.5–5.2)
PROCALCITONIN SERPL-MCNC: 0.04 NG/ML (ref 0–0.25)
RBC # BLD AUTO: 4.35 10*6/MM3 (ref 3.77–5.28)
RHINOVIRUS RNA SPEC NAA+PROBE: NOT DETECTED
RSV RNA NPH QL NAA+NON-PROBE: NOT DETECTED
SARS-COV-2 RNA RESP QL NAA+PROBE: NOT DETECTED
SODIUM SERPL-SCNC: 143 MMOL/L (ref 136–145)
SPECT HRT GATED+EF W RNC IV: 69 %
STRESS BASELINE BP: NORMAL MMHG
STRESS BASELINE HR: 62 BPM
STRESS O2 SAT REST: 93 %
STRESS PERCENT HR: 62 %
STRESS POST ESTIMATED WORKLOAD: 1 METS
STRESS POST EXERCISE DUR MIN: 4 MIN
STRESS POST EXERCISE DUR SEC: 0 SEC
STRESS POST O2 SAT PEAK: 96 %
STRESS POST PEAK BP: NORMAL MMHG
STRESS POST PEAK HR: 81 BPM
STRESS TARGET HR: 131 BPM
TRIGL SERPL-MCNC: 82 MG/DL (ref 0–150)
TROPONIN T SERPL HS-MCNC: 25 NG/L
VLDLC SERPL-MCNC: 16 MG/DL (ref 5–40)
WBC NRBC COR # BLD AUTO: 5.69 10*3/MM3 (ref 3.4–10.8)

## 2025-08-12 PROCEDURE — 99214 OFFICE O/P EST MOD 30 MIN: CPT | Performed by: PHYSICIAN ASSISTANT

## 2025-08-12 PROCEDURE — G0378 HOSPITAL OBSERVATION PER HR: HCPCS

## 2025-08-12 PROCEDURE — 80061 LIPID PANEL: CPT | Performed by: NURSE PRACTITIONER

## 2025-08-12 PROCEDURE — 78431 MYOCRD IMG PET RST&STRS CT: CPT

## 2025-08-12 PROCEDURE — 25010000002 MAGNESIUM SULFATE 2 GM/50ML SOLUTION: Performed by: NURSE PRACTITIONER

## 2025-08-12 PROCEDURE — 85025 COMPLETE CBC W/AUTO DIFF WBC: CPT | Performed by: NURSE PRACTITIONER

## 2025-08-12 PROCEDURE — 97161 PT EVAL LOW COMPLEX 20 MIN: CPT

## 2025-08-12 PROCEDURE — 99222 1ST HOSP IP/OBS MODERATE 55: CPT | Performed by: NURSE PRACTITIONER

## 2025-08-12 PROCEDURE — 25010000002 REGADENOSON 0.4 MG/5ML SOLUTION: Performed by: PHYSICIAN ASSISTANT

## 2025-08-12 PROCEDURE — 80048 BASIC METABOLIC PNL TOTAL CA: CPT | Performed by: NURSE PRACTITIONER

## 2025-08-12 PROCEDURE — 93018 CV STRESS TEST I&R ONLY: CPT | Performed by: INTERNAL MEDICINE

## 2025-08-12 PROCEDURE — 96376 TX/PRO/DX INJ SAME DRUG ADON: CPT

## 2025-08-12 PROCEDURE — 96367 TX/PROPH/DG ADDL SEQ IV INF: CPT

## 2025-08-12 PROCEDURE — 84484 ASSAY OF TROPONIN QUANT: CPT | Performed by: NURSE PRACTITIONER

## 2025-08-12 PROCEDURE — 93017 CV STRESS TEST TRACING ONLY: CPT

## 2025-08-12 PROCEDURE — 96375 TX/PRO/DX INJ NEW DRUG ADDON: CPT

## 2025-08-12 PROCEDURE — 96366 THER/PROPH/DIAG IV INF ADDON: CPT

## 2025-08-12 PROCEDURE — 87040 BLOOD CULTURE FOR BACTERIA: CPT | Performed by: EMERGENCY MEDICINE

## 2025-08-12 PROCEDURE — 25010000002 METHYLPREDNISOLONE PER 125 MG: Performed by: EMERGENCY MEDICINE

## 2025-08-12 PROCEDURE — 0202U NFCT DS 22 TRGT SARS-COV-2: CPT | Performed by: NURSE PRACTITIONER

## 2025-08-12 PROCEDURE — 96365 THER/PROPH/DIAG IV INF INIT: CPT

## 2025-08-12 PROCEDURE — 83036 HEMOGLOBIN GLYCOSYLATED A1C: CPT | Performed by: NURSE PRACTITIONER

## 2025-08-12 PROCEDURE — 83605 ASSAY OF LACTIC ACID: CPT | Performed by: EMERGENCY MEDICINE

## 2025-08-12 PROCEDURE — 97165 OT EVAL LOW COMPLEX 30 MIN: CPT

## 2025-08-12 PROCEDURE — 25010000002 BUMETANIDE PER 0.5 MG: Performed by: NURSE PRACTITIONER

## 2025-08-12 PROCEDURE — 93005 ELECTROCARDIOGRAM TRACING: CPT | Performed by: NURSE PRACTITIONER

## 2025-08-12 PROCEDURE — 93016 CV STRESS TEST SUPVJ ONLY: CPT | Performed by: INTERNAL MEDICINE

## 2025-08-12 PROCEDURE — 78431 MYOCRD IMG PET RST&STRS CT: CPT | Performed by: INTERNAL MEDICINE

## 2025-08-12 PROCEDURE — 94640 AIRWAY INHALATION TREATMENT: CPT

## 2025-08-12 PROCEDURE — 25010000002 CEFTRIAXONE PER 250 MG

## 2025-08-12 PROCEDURE — 25010000002 ONDANSETRON PER 1 MG: Performed by: NURSE PRACTITIONER

## 2025-08-12 RX ORDER — ONDANSETRON 2 MG/ML
4 INJECTION INTRAMUSCULAR; INTRAVENOUS EVERY 6 HOURS PRN
Status: DISCONTINUED | OUTPATIENT
Start: 2025-08-12 | End: 2025-08-14 | Stop reason: HOSPADM

## 2025-08-12 RX ORDER — PANTOPRAZOLE SODIUM 40 MG/1
40 TABLET, DELAYED RELEASE ORAL
Status: DISCONTINUED | OUTPATIENT
Start: 2025-08-12 | End: 2025-08-14 | Stop reason: HOSPADM

## 2025-08-12 RX ORDER — ACETAMINOPHEN 650 MG/1
650 SUPPOSITORY RECTAL EVERY 4 HOURS PRN
Status: DISCONTINUED | OUTPATIENT
Start: 2025-08-12 | End: 2025-08-14 | Stop reason: HOSPADM

## 2025-08-12 RX ORDER — FLUOXETINE 10 MG/1
10 CAPSULE ORAL DAILY
Status: DISCONTINUED | OUTPATIENT
Start: 2025-08-12 | End: 2025-08-14 | Stop reason: HOSPADM

## 2025-08-12 RX ORDER — NITROGLYCERIN 0.4 MG/1
0.4 TABLET SUBLINGUAL
Status: DISCONTINUED | OUTPATIENT
Start: 2025-08-12 | End: 2025-08-12

## 2025-08-12 RX ORDER — ACETAMINOPHEN 160 MG/5ML
650 SOLUTION ORAL EVERY 4 HOURS PRN
Status: DISCONTINUED | OUTPATIENT
Start: 2025-08-12 | End: 2025-08-14 | Stop reason: HOSPADM

## 2025-08-12 RX ORDER — METHYLPREDNISOLONE SODIUM SUCCINATE 125 MG/2ML
125 INJECTION, POWDER, LYOPHILIZED, FOR SOLUTION INTRAMUSCULAR; INTRAVENOUS ONCE
Status: COMPLETED | OUTPATIENT
Start: 2025-08-12 | End: 2025-08-12

## 2025-08-12 RX ORDER — POLYETHYLENE GLYCOL 3350 17 G/17G
17 POWDER, FOR SOLUTION ORAL DAILY PRN
Status: DISCONTINUED | OUTPATIENT
Start: 2025-08-12 | End: 2025-08-14 | Stop reason: HOSPADM

## 2025-08-12 RX ORDER — BISACODYL 10 MG
10 SUPPOSITORY, RECTAL RECTAL DAILY PRN
Status: DISCONTINUED | OUTPATIENT
Start: 2025-08-12 | End: 2025-08-14 | Stop reason: HOSPADM

## 2025-08-12 RX ORDER — BUMETANIDE 0.25 MG/ML
1 INJECTION, SOLUTION INTRAMUSCULAR; INTRAVENOUS EVERY 12 HOURS SCHEDULED
Status: DISCONTINUED | OUTPATIENT
Start: 2025-08-12 | End: 2025-08-14

## 2025-08-12 RX ORDER — SPIRONOLACTONE 25 MG/1
25 TABLET ORAL DAILY
Status: DISCONTINUED | OUTPATIENT
Start: 2025-08-12 | End: 2025-08-14 | Stop reason: HOSPADM

## 2025-08-12 RX ORDER — SODIUM CHLORIDE 0.9 % (FLUSH) 0.9 %
10 SYRINGE (ML) INJECTION EVERY 12 HOURS SCHEDULED
Status: DISCONTINUED | OUTPATIENT
Start: 2025-08-12 | End: 2025-08-14 | Stop reason: HOSPADM

## 2025-08-12 RX ORDER — LOSARTAN POTASSIUM 25 MG/1
25 TABLET ORAL DAILY
Status: DISCONTINUED | OUTPATIENT
Start: 2025-08-12 | End: 2025-08-14 | Stop reason: HOSPADM

## 2025-08-12 RX ORDER — CAFFEINE CITRATE 20 MG/ML
60 SOLUTION INTRAVENOUS ONCE
Status: COMPLETED | OUTPATIENT
Start: 2025-08-12 | End: 2025-08-12

## 2025-08-12 RX ORDER — ACETAMINOPHEN 325 MG/1
650 TABLET ORAL EVERY 4 HOURS PRN
Status: DISCONTINUED | OUTPATIENT
Start: 2025-08-12 | End: 2025-08-14 | Stop reason: HOSPADM

## 2025-08-12 RX ORDER — ONDANSETRON 4 MG/1
4 TABLET, ORALLY DISINTEGRATING ORAL EVERY 6 HOURS PRN
Status: DISCONTINUED | OUTPATIENT
Start: 2025-08-12 | End: 2025-08-14 | Stop reason: HOSPADM

## 2025-08-12 RX ORDER — SODIUM CHLORIDE 9 MG/ML
40 INJECTION, SOLUTION INTRAVENOUS AS NEEDED
Status: DISCONTINUED | OUTPATIENT
Start: 2025-08-12 | End: 2025-08-14 | Stop reason: HOSPADM

## 2025-08-12 RX ORDER — REGADENOSON 0.08 MG/ML
0.4 INJECTION, SOLUTION INTRAVENOUS ONCE
Status: COMPLETED | OUTPATIENT
Start: 2025-08-12 | End: 2025-08-12

## 2025-08-12 RX ORDER — ROSUVASTATIN CALCIUM 10 MG/1
10 TABLET, COATED ORAL DAILY
Status: DISCONTINUED | OUTPATIENT
Start: 2025-08-12 | End: 2025-08-13

## 2025-08-12 RX ORDER — SODIUM CHLORIDE 0.9 % (FLUSH) 0.9 %
10 SYRINGE (ML) INJECTION AS NEEDED
Status: DISCONTINUED | OUTPATIENT
Start: 2025-08-12 | End: 2025-08-14 | Stop reason: HOSPADM

## 2025-08-12 RX ORDER — BISACODYL 5 MG/1
5 TABLET, DELAYED RELEASE ORAL DAILY PRN
Status: DISCONTINUED | OUTPATIENT
Start: 2025-08-12 | End: 2025-08-14 | Stop reason: HOSPADM

## 2025-08-12 RX ORDER — AMOXICILLIN 250 MG
2 CAPSULE ORAL 2 TIMES DAILY PRN
Status: DISCONTINUED | OUTPATIENT
Start: 2025-08-12 | End: 2025-08-14 | Stop reason: HOSPADM

## 2025-08-12 RX ORDER — MAGNESIUM SULFATE HEPTAHYDRATE 40 MG/ML
2 INJECTION, SOLUTION INTRAVENOUS ONCE
Status: COMPLETED | OUTPATIENT
Start: 2025-08-12 | End: 2025-08-12

## 2025-08-12 RX ORDER — IPRATROPIUM BROMIDE AND ALBUTEROL SULFATE 2.5; .5 MG/3ML; MG/3ML
3 SOLUTION RESPIRATORY (INHALATION) ONCE
Status: COMPLETED | OUTPATIENT
Start: 2025-08-12 | End: 2025-08-12

## 2025-08-12 RX ORDER — NITROGLYCERIN 0.4 MG/1
0.4 TABLET SUBLINGUAL
Status: DISCONTINUED | OUTPATIENT
Start: 2025-08-12 | End: 2025-08-14 | Stop reason: HOSPADM

## 2025-08-12 RX ADMIN — SPIRONOLACTONE 25 MG: 25 TABLET ORAL at 16:08

## 2025-08-12 RX ADMIN — BUMETANIDE 1 MG: 0.25 INJECTION INTRAMUSCULAR; INTRAVENOUS at 03:32

## 2025-08-12 RX ADMIN — TRAZODONE HYDROCHLORIDE 150 MG: 100 TABLET ORAL at 22:15

## 2025-08-12 RX ADMIN — CEFTRIAXONE 2000 MG: 2 INJECTION, POWDER, FOR SOLUTION INTRAMUSCULAR; INTRAVENOUS at 02:25

## 2025-08-12 RX ADMIN — FLUOXETINE HYDROCHLORIDE 10 MG: 10 CAPSULE ORAL at 16:08

## 2025-08-12 RX ADMIN — METHYLPREDNISOLONE SODIUM SUCCINATE 125 MG: 125 INJECTION, POWDER, FOR SOLUTION INTRAMUSCULAR; INTRAVENOUS at 01:29

## 2025-08-12 RX ADMIN — REGADENOSON 0.4 MG: 0.08 INJECTION, SOLUTION INTRAVENOUS at 13:19

## 2025-08-12 RX ADMIN — ONDANSETRON 4 MG: 2 INJECTION INTRAMUSCULAR; INTRAVENOUS at 23:23

## 2025-08-12 RX ADMIN — Medication 10 ML: at 16:13

## 2025-08-12 RX ADMIN — LOSARTAN POTASSIUM 25 MG: 25 TABLET, FILM COATED ORAL at 16:08

## 2025-08-12 RX ADMIN — PANTOPRAZOLE SODIUM 40 MG: 40 TABLET, DELAYED RELEASE ORAL at 16:08

## 2025-08-12 RX ADMIN — CAFFEINE CITRATE 60 MG: 20 INJECTION, SOLUTION INTRAVENOUS at 13:35

## 2025-08-12 RX ADMIN — IPRATROPIUM BROMIDE AND ALBUTEROL SULFATE 3 ML: .5; 3 SOLUTION RESPIRATORY (INHALATION) at 02:01

## 2025-08-12 RX ADMIN — DICLOFENAC SODIUM 2 G: 10 GEL TOPICAL at 18:04

## 2025-08-12 RX ADMIN — BUMETANIDE 1 MG: 0.25 INJECTION INTRAMUSCULAR; INTRAVENOUS at 18:04

## 2025-08-12 RX ADMIN — DICLOFENAC SODIUM 2 G: 10 GEL TOPICAL at 22:11

## 2025-08-12 RX ADMIN — ROSUVASTATIN CALCIUM 10 MG: 10 TABLET, FILM COATED ORAL at 16:13

## 2025-08-12 RX ADMIN — QUETIAPINE FUMARATE 150 MG: 25 TABLET ORAL at 22:10

## 2025-08-12 RX ADMIN — ACETAMINOPHEN 650 MG: 325 TABLET ORAL at 11:33

## 2025-08-12 RX ADMIN — MAGNESIUM SULFATE IN WATER FOR 2 G: 40 INJECTION INTRAVENOUS at 03:36

## 2025-08-13 LAB
QT INTERVAL: 452 MS
QTC INTERVAL: 458 MS

## 2025-08-13 PROCEDURE — 93010 ELECTROCARDIOGRAM REPORT: CPT | Performed by: INTERNAL MEDICINE

## 2025-08-13 PROCEDURE — 99232 SBSQ HOSP IP/OBS MODERATE 35: CPT | Performed by: PHYSICIAN ASSISTANT

## 2025-08-13 PROCEDURE — 25010000002 BUMETANIDE PER 0.5 MG: Performed by: NURSE PRACTITIONER

## 2025-08-13 PROCEDURE — 99214 OFFICE O/P EST MOD 30 MIN: CPT | Performed by: INTERNAL MEDICINE

## 2025-08-13 PROCEDURE — G0378 HOSPITAL OBSERVATION PER HR: HCPCS

## 2025-08-13 PROCEDURE — 96376 TX/PRO/DX INJ SAME DRUG ADON: CPT

## 2025-08-13 PROCEDURE — 93005 ELECTROCARDIOGRAM TRACING: CPT | Performed by: NURSE PRACTITIONER

## 2025-08-13 RX ORDER — ROSUVASTATIN CALCIUM 10 MG/1
10 TABLET, COATED ORAL DAILY
Status: DISCONTINUED | OUTPATIENT
Start: 2025-08-13 | End: 2025-08-14 | Stop reason: HOSPADM

## 2025-08-13 RX ADMIN — BUMETANIDE 1 MG: 0.25 INJECTION INTRAMUSCULAR; INTRAVENOUS at 18:15

## 2025-08-13 RX ADMIN — DICLOFENAC SODIUM 2 G: 10 GEL TOPICAL at 22:09

## 2025-08-13 RX ADMIN — SPIRONOLACTONE 25 MG: 25 TABLET ORAL at 08:32

## 2025-08-13 RX ADMIN — LOSARTAN POTASSIUM 25 MG: 25 TABLET, FILM COATED ORAL at 08:32

## 2025-08-13 RX ADMIN — BUMETANIDE 1 MG: 0.25 INJECTION INTRAMUSCULAR; INTRAVENOUS at 06:11

## 2025-08-13 RX ADMIN — Medication 10 ML: at 08:33

## 2025-08-13 RX ADMIN — TRAZODONE HYDROCHLORIDE 150 MG: 100 TABLET ORAL at 21:32

## 2025-08-13 RX ADMIN — QUETIAPINE FUMARATE 150 MG: 25 TABLET ORAL at 21:32

## 2025-08-13 RX ADMIN — PANTOPRAZOLE SODIUM 40 MG: 40 TABLET, DELAYED RELEASE ORAL at 06:11

## 2025-08-13 RX ADMIN — ROSUVASTATIN CALCIUM 10 MG: 10 TABLET, FILM COATED ORAL at 21:32

## 2025-08-14 ENCOUNTER — READMISSION MANAGEMENT (OUTPATIENT)
Dept: CALL CENTER | Facility: HOSPITAL | Age: 65
End: 2025-08-14
Payer: MEDICAID

## 2025-08-14 VITALS
SYSTOLIC BLOOD PRESSURE: 109 MMHG | TEMPERATURE: 98.1 F | HEART RATE: 87 BPM | DIASTOLIC BLOOD PRESSURE: 71 MMHG | HEIGHT: 65 IN | OXYGEN SATURATION: 93 % | RESPIRATION RATE: 18 BRPM | WEIGHT: 257.3 LBS | BODY MASS INDEX: 42.87 KG/M2

## 2025-08-14 PROBLEM — R06.00 DYSPNEA: Status: ACTIVE | Noted: 2025-08-14

## 2025-08-14 PROBLEM — I50.32 CHRONIC HEART FAILURE WITH PRESERVED EJECTION FRACTION (HFPEF): Status: ACTIVE | Noted: 2025-08-14

## 2025-08-14 LAB
ANION GAP SERPL CALCULATED.3IONS-SCNC: 11.4 MMOL/L (ref 5–15)
BUN SERPL-MCNC: 19.8 MG/DL (ref 8–23)
BUN/CREAT SERPL: 20.4 (ref 7–25)
CALCIUM SPEC-SCNC: 9.1 MG/DL (ref 8.6–10.5)
CHLORIDE SERPL-SCNC: 93 MMOL/L (ref 98–107)
CO2 SERPL-SCNC: 36.6 MMOL/L (ref 22–29)
CREAT SERPL-MCNC: 0.97 MG/DL (ref 0.57–1)
EGFRCR SERPLBLD CKD-EPI 2021: 64.6 ML/MIN/1.73
GLUCOSE SERPL-MCNC: 131 MG/DL (ref 65–99)
POTASSIUM SERPL-SCNC: 3.2 MMOL/L (ref 3.5–5.2)
QT INTERVAL: 0 MS
QTC INTERVAL: 0 MS
SODIUM SERPL-SCNC: 141 MMOL/L (ref 136–145)

## 2025-08-14 PROCEDURE — 93005 ELECTROCARDIOGRAM TRACING: CPT | Performed by: NURSE PRACTITIONER

## 2025-08-14 PROCEDURE — 99239 HOSP IP/OBS DSCHRG MGMT >30: CPT | Performed by: PHYSICIAN ASSISTANT

## 2025-08-14 PROCEDURE — 99214 OFFICE O/P EST MOD 30 MIN: CPT

## 2025-08-14 PROCEDURE — 97116 GAIT TRAINING THERAPY: CPT

## 2025-08-14 PROCEDURE — G0378 HOSPITAL OBSERVATION PER HR: HCPCS

## 2025-08-14 PROCEDURE — 93010 ELECTROCARDIOGRAM REPORT: CPT | Performed by: INTERNAL MEDICINE

## 2025-08-14 PROCEDURE — 97530 THERAPEUTIC ACTIVITIES: CPT

## 2025-08-14 PROCEDURE — 93005 ELECTROCARDIOGRAM TRACING: CPT | Performed by: STUDENT IN AN ORGANIZED HEALTH CARE EDUCATION/TRAINING PROGRAM

## 2025-08-14 PROCEDURE — 25010000002 BUMETANIDE PER 0.5 MG: Performed by: NURSE PRACTITIONER

## 2025-08-14 PROCEDURE — 80048 BASIC METABOLIC PNL TOTAL CA: CPT

## 2025-08-14 PROCEDURE — 96376 TX/PRO/DX INJ SAME DRUG ADON: CPT

## 2025-08-14 RX ORDER — LOSARTAN POTASSIUM 25 MG/1
25 TABLET ORAL DAILY
Qty: 30 TABLET | Refills: 0 | Status: SHIPPED | OUTPATIENT
Start: 2025-08-15 | End: 2025-08-21 | Stop reason: SDUPTHER

## 2025-08-14 RX ORDER — SPIRONOLACTONE 25 MG/1
25 TABLET ORAL DAILY
Qty: 30 TABLET | Refills: 0 | Status: SHIPPED | OUTPATIENT
Start: 2025-08-15 | End: 2025-08-21 | Stop reason: SDUPTHER

## 2025-08-14 RX ORDER — FUROSEMIDE 40 MG/1
40 TABLET ORAL DAILY
Status: DISCONTINUED | OUTPATIENT
Start: 2025-08-15 | End: 2025-08-14 | Stop reason: HOSPADM

## 2025-08-14 RX ORDER — FUROSEMIDE 20 MG/1
40 TABLET ORAL DAILY
Qty: 60 TABLET | Refills: 0 | Status: SHIPPED | OUTPATIENT
Start: 2025-08-14

## 2025-08-14 RX ADMIN — PANTOPRAZOLE SODIUM 40 MG: 40 TABLET, DELAYED RELEASE ORAL at 05:38

## 2025-08-14 RX ADMIN — DICLOFENAC SODIUM 2 G: 10 GEL TOPICAL at 10:34

## 2025-08-14 RX ADMIN — SPIRONOLACTONE 25 MG: 25 TABLET ORAL at 10:34

## 2025-08-14 RX ADMIN — FLUOXETINE HYDROCHLORIDE 10 MG: 10 CAPSULE ORAL at 10:34

## 2025-08-14 RX ADMIN — BUMETANIDE 1 MG: 0.25 INJECTION INTRAMUSCULAR; INTRAVENOUS at 05:39

## 2025-08-14 RX ADMIN — Medication 10 ML: at 10:35

## 2025-08-14 RX ADMIN — LOSARTAN POTASSIUM 25 MG: 25 TABLET, FILM COATED ORAL at 10:34

## 2025-08-15 ENCOUNTER — TRANSITIONAL CARE MANAGEMENT TELEPHONE ENCOUNTER (OUTPATIENT)
Dept: CALL CENTER | Facility: HOSPITAL | Age: 65
End: 2025-08-15
Payer: MEDICAID

## 2025-08-17 LAB
BACTERIA SPEC AEROBE CULT: NORMAL
BACTERIA SPEC AEROBE CULT: NORMAL
QT INTERVAL: 490 MS
QTC INTERVAL: 502 MS

## 2025-08-18 ENCOUNTER — TRANSITIONAL CARE MANAGEMENT TELEPHONE ENCOUNTER (OUTPATIENT)
Dept: CALL CENTER | Facility: HOSPITAL | Age: 65
End: 2025-08-18
Payer: MEDICAID

## 2025-08-18 LAB
QT INTERVAL: 468 MS
QTC INTERVAL: 478 MS

## 2025-08-21 ENCOUNTER — TELEMEDICINE (OUTPATIENT)
Dept: FAMILY MEDICINE CLINIC | Facility: CLINIC | Age: 65
End: 2025-08-21
Payer: MEDICAID

## 2025-08-21 DIAGNOSIS — E66.2 OBESITY HYPOVENTILATION SYNDROME: ICD-10-CM

## 2025-08-21 DIAGNOSIS — J96.91 RESPIRATORY FAILURE WITH HYPOXIA, UNSPECIFIED CHRONICITY: Primary | ICD-10-CM

## 2025-08-21 DIAGNOSIS — E66.01 MORBID (SEVERE) OBESITY DUE TO EXCESS CALORIES: ICD-10-CM

## 2025-08-21 DIAGNOSIS — R31.29 MICROSCOPIC HEMATURIA: ICD-10-CM

## 2025-08-21 RX ORDER — LOSARTAN POTASSIUM 25 MG/1
25 TABLET ORAL DAILY
Qty: 90 TABLET | Refills: 0 | Status: SHIPPED | OUTPATIENT
Start: 2025-08-21

## 2025-08-21 RX ORDER — SPIRONOLACTONE 25 MG/1
25 TABLET ORAL DAILY
Qty: 90 TABLET | Refills: 0 | Status: SHIPPED | OUTPATIENT
Start: 2025-08-21

## 2025-08-21 RX ORDER — SEMAGLUTIDE 1.34 MG/ML
0.25 INJECTION, SOLUTION SUBCUTANEOUS WEEKLY
Qty: 2 ML | Refills: 0 | Status: SHIPPED | OUTPATIENT
Start: 2025-08-21

## 2025-08-22 ENCOUNTER — TELEPHONE (OUTPATIENT)
Dept: FAMILY MEDICINE CLINIC | Facility: CLINIC | Age: 65
End: 2025-08-22
Payer: MEDICAID